# Patient Record
Sex: MALE | Race: BLACK OR AFRICAN AMERICAN | Employment: FULL TIME | URBAN - METROPOLITAN AREA
[De-identification: names, ages, dates, MRNs, and addresses within clinical notes are randomized per-mention and may not be internally consistent; named-entity substitution may affect disease eponyms.]

---

## 2017-02-09 ENCOUNTER — HOSPITAL ENCOUNTER (OUTPATIENT)
Dept: GENERAL RADIOLOGY | Age: 57
Discharge: HOME | End: 2017-02-09
Payer: COMMERCIAL

## 2017-02-09 ENCOUNTER — OFFICE VISIT (OUTPATIENT)
Dept: INTERNAL MEDICINE | Age: 57
End: 2017-02-09

## 2017-02-09 VITALS
DIASTOLIC BLOOD PRESSURE: 80 MMHG | OXYGEN SATURATION: 99 % | BODY MASS INDEX: 26.19 KG/M2 | HEIGHT: 75 IN | WEIGHT: 210.6 LBS | HEART RATE: 58 BPM | SYSTOLIC BLOOD PRESSURE: 142 MMHG | RESPIRATION RATE: 12 BRPM | TEMPERATURE: 97.1 F

## 2017-02-09 DIAGNOSIS — M17.0 OSTEOARTHRITIS OF BOTH KNEES, UNSPECIFIED OSTEOARTHRITIS TYPE: ICD-10-CM

## 2017-02-09 DIAGNOSIS — Z23 NEED FOR INFLUENZA VACCINATION: Primary | ICD-10-CM

## 2017-02-09 DIAGNOSIS — I10 ESSENTIAL HYPERTENSION: ICD-10-CM

## 2017-02-09 DIAGNOSIS — Z11.4 SCREENING FOR HIV (HUMAN IMMUNODEFICIENCY VIRUS): ICD-10-CM

## 2017-02-09 DIAGNOSIS — Z11.59 NEED FOR HEPATITIS C SCREENING TEST: ICD-10-CM

## 2017-02-09 DIAGNOSIS — Z98.890 S/P AORTIC ANEURYSM REPAIR: ICD-10-CM

## 2017-02-09 DIAGNOSIS — Z86.79 S/P AORTIC ANEURYSM REPAIR: ICD-10-CM

## 2017-02-09 PROCEDURE — 73565 X-RAY EXAM OF KNEES: CPT

## 2017-02-09 PROCEDURE — 90688 IIV4 VACCINE SPLT 0.5 ML IM: CPT | Performed by: FAMILY MEDICINE

## 2017-02-09 PROCEDURE — 90471 IMMUNIZATION ADMIN: CPT | Performed by: FAMILY MEDICINE

## 2017-02-09 PROCEDURE — 99204 OFFICE O/P NEW MOD 45 MIN: CPT | Performed by: FAMILY MEDICINE

## 2017-02-09 RX ORDER — AMLODIPINE BESYLATE 10 MG/1
10 TABLET ORAL DAILY
COMMUNITY

## 2017-02-09 RX ORDER — METOPROLOL TARTRATE 100 MG/1
100 TABLET ORAL 2 TIMES DAILY
COMMUNITY

## 2017-02-10 DIAGNOSIS — M17.0 OSTEOARTHRITIS OF BOTH KNEES, UNSPECIFIED OSTEOARTHRITIS TYPE: ICD-10-CM

## 2017-02-10 DIAGNOSIS — I10 ESSENTIAL HYPERTENSION: ICD-10-CM

## 2017-02-10 DIAGNOSIS — Z11.4 SCREENING FOR HIV (HUMAN IMMUNODEFICIENCY VIRUS): ICD-10-CM

## 2017-02-10 LAB
ALBUMIN SERPL-MCNC: 4.4 G/DL (ref 3.9–4.9)
ALP BLD-CCNC: 71 U/L (ref 35–104)
ALT SERPL-CCNC: 23 U/L (ref 0–41)
ANION GAP SERPL CALCULATED.3IONS-SCNC: 11 MEQ/L (ref 7–13)
AST SERPL-CCNC: 24 U/L (ref 0–40)
BASOPHILS ABSOLUTE: 0.1 K/UL (ref 0–0.2)
BASOPHILS RELATIVE PERCENT: 1.2 %
BILIRUB SERPL-MCNC: 0.5 MG/DL (ref 0–1.2)
BUN BLDV-MCNC: 17 MG/DL (ref 6–20)
CALCIUM SERPL-MCNC: 9.5 MG/DL (ref 8.6–10.2)
CHLORIDE BLD-SCNC: 102 MEQ/L (ref 98–107)
CHOLESTEROL, TOTAL: 209 MG/DL (ref 0–199)
CO2: 27 MEQ/L (ref 22–29)
CREAT SERPL-MCNC: 1.15 MG/DL (ref 0.7–1.2)
EOSINOPHILS ABSOLUTE: 0.2 K/UL (ref 0–0.7)
EOSINOPHILS RELATIVE PERCENT: 2.8 %
GFR AFRICAN AMERICAN: >60
GFR NON-AFRICAN AMERICAN: >60
GLOBULIN: 2.7 G/DL (ref 2.3–3.5)
GLUCOSE BLD-MCNC: 99 MG/DL (ref 74–109)
HCT VFR BLD CALC: 46.5 % (ref 42–52)
HDLC SERPL-MCNC: 84 MG/DL (ref 40–59)
HEMOGLOBIN: 15.6 G/DL (ref 14–18)
HEPATITIS C ANTIBODY INTERPRETATION: NORMAL
LDL CHOLESTEROL CALCULATED: 106 MG/DL (ref 0–129)
LYMPHOCYTES ABSOLUTE: 1.4 K/UL (ref 1–4.8)
LYMPHOCYTES RELATIVE PERCENT: 23.7 %
MCH RBC QN AUTO: 30.3 PG (ref 27–31.3)
MCHC RBC AUTO-ENTMCNC: 33.5 % (ref 33–37)
MCV RBC AUTO: 90.4 FL (ref 80–100)
MONOCYTES ABSOLUTE: 0.6 K/UL (ref 0.2–0.8)
MONOCYTES RELATIVE PERCENT: 9.7 %
NEUTROPHILS ABSOLUTE: 3.8 K/UL (ref 1.4–6.5)
NEUTROPHILS RELATIVE PERCENT: 62.6 %
PDW BLD-RTO: 13.1 % (ref 11.5–14.5)
PLATELET # BLD: 205 K/UL (ref 130–400)
POTASSIUM SERPL-SCNC: 4.2 MEQ/L (ref 3.5–5.1)
RBC # BLD: 5.15 M/UL (ref 4.7–6.1)
SODIUM BLD-SCNC: 140 MEQ/L (ref 132–144)
TOTAL PROTEIN: 7.1 G/DL (ref 6.4–8.1)
TRIGL SERPL-MCNC: 94 MG/DL (ref 0–200)
WBC # BLD: 6 K/UL (ref 4.8–10.8)

## 2017-02-12 LAB — HIV-1 AND HIV-2 ANTIBODIES: NEGATIVE

## 2017-02-13 ENCOUNTER — OFFICE VISIT (OUTPATIENT)
Dept: INTERNAL MEDICINE | Age: 57
End: 2017-02-13

## 2017-02-13 VITALS
HEART RATE: 52 BPM | TEMPERATURE: 97 F | HEIGHT: 74 IN | OXYGEN SATURATION: 99 % | RESPIRATION RATE: 12 BRPM | DIASTOLIC BLOOD PRESSURE: 78 MMHG | WEIGHT: 209 LBS | SYSTOLIC BLOOD PRESSURE: 152 MMHG | BODY MASS INDEX: 26.82 KG/M2

## 2017-02-13 DIAGNOSIS — I10 ESSENTIAL HYPERTENSION: Primary | ICD-10-CM

## 2017-02-13 DIAGNOSIS — M17.0 OSTEOARTHRITIS OF BOTH KNEES, UNSPECIFIED OSTEOARTHRITIS TYPE: ICD-10-CM

## 2017-02-13 PROCEDURE — 99214 OFFICE O/P EST MOD 30 MIN: CPT | Performed by: FAMILY MEDICINE

## 2017-02-13 RX ORDER — TRAMADOL HYDROCHLORIDE 50 MG/1
50 TABLET ORAL EVERY 8 HOURS PRN
Qty: 30 TABLET | Refills: 0 | Status: SHIPPED | OUTPATIENT
Start: 2017-02-13 | End: 2017-02-23

## 2017-02-17 ENCOUNTER — HOSPITAL ENCOUNTER (OUTPATIENT)
Dept: PHYSICAL THERAPY | Age: 57
Setting detail: THERAPIES SERIES
Discharge: HOME | End: 2017-02-17
Payer: COMMERCIAL

## 2017-02-17 PROCEDURE — 97110 THERAPEUTIC EXERCISES: CPT

## 2017-02-17 PROCEDURE — 97161 PT EVAL LOW COMPLEX 20 MIN: CPT

## 2017-02-21 ENCOUNTER — OFFICE VISIT (OUTPATIENT)
Dept: CARDIOLOGY | Age: 57
End: 2017-02-21

## 2017-02-21 ENCOUNTER — OFFICE VISIT (OUTPATIENT)
Dept: INTERNAL MEDICINE | Age: 57
End: 2017-02-21

## 2017-02-21 VITALS
OXYGEN SATURATION: 98 % | TEMPERATURE: 98.1 F | DIASTOLIC BLOOD PRESSURE: 84 MMHG | WEIGHT: 209.5 LBS | RESPIRATION RATE: 18 BRPM | HEIGHT: 74 IN | BODY MASS INDEX: 26.89 KG/M2 | SYSTOLIC BLOOD PRESSURE: 157 MMHG | HEART RATE: 57 BPM

## 2017-02-21 VITALS
WEIGHT: 209 LBS | SYSTOLIC BLOOD PRESSURE: 160 MMHG | TEMPERATURE: 96.8 F | OXYGEN SATURATION: 99 % | HEIGHT: 74 IN | BODY MASS INDEX: 26.82 KG/M2 | RESPIRATION RATE: 12 BRPM | HEART RATE: 58 BPM | DIASTOLIC BLOOD PRESSURE: 88 MMHG

## 2017-02-21 DIAGNOSIS — I10 ESSENTIAL HYPERTENSION: ICD-10-CM

## 2017-02-21 DIAGNOSIS — Z98.890 S/P THORACIC AORTIC ANEURYSM REPAIR: ICD-10-CM

## 2017-02-21 DIAGNOSIS — Z86.79 S/P THORACIC AORTIC ANEURYSM REPAIR: ICD-10-CM

## 2017-02-21 DIAGNOSIS — I10 ESSENTIAL HYPERTENSION: Primary | ICD-10-CM

## 2017-02-21 DIAGNOSIS — L65.9 HAIR LOSS: Primary | ICD-10-CM

## 2017-02-21 PROCEDURE — 93000 ELECTROCARDIOGRAM COMPLETE: CPT | Performed by: INTERNAL MEDICINE

## 2017-02-21 PROCEDURE — 99204 OFFICE O/P NEW MOD 45 MIN: CPT | Performed by: INTERNAL MEDICINE

## 2017-02-21 PROCEDURE — 99213 OFFICE O/P EST LOW 20 MIN: CPT | Performed by: FAMILY MEDICINE

## 2017-02-21 RX ORDER — HYDROCHLOROTHIAZIDE 25 MG/1
25 TABLET ORAL DAILY
Qty: 30 TABLET | Refills: 3 | Status: SHIPPED | OUTPATIENT
Start: 2017-02-21 | End: 2017-06-19 | Stop reason: SDUPTHER

## 2017-02-22 ENCOUNTER — HOSPITAL ENCOUNTER (OUTPATIENT)
Dept: PHYSICAL THERAPY | Age: 57
Setting detail: THERAPIES SERIES
Discharge: HOME | End: 2017-02-22
Payer: COMMERCIAL

## 2017-02-22 PROCEDURE — 97113 AQUATIC THERAPY/EXERCISES: CPT

## 2017-02-27 ENCOUNTER — HOSPITAL ENCOUNTER (OUTPATIENT)
Dept: PHYSICAL THERAPY | Age: 57
Setting detail: THERAPIES SERIES
Discharge: HOME | End: 2017-02-27
Payer: COMMERCIAL

## 2017-02-27 PROCEDURE — 97113 AQUATIC THERAPY/EXERCISES: CPT

## 2017-03-01 ENCOUNTER — HOSPITAL ENCOUNTER (OUTPATIENT)
Dept: PHYSICAL THERAPY | Age: 57
Setting detail: THERAPIES SERIES
Discharge: HOME | End: 2017-03-01
Payer: COMMERCIAL

## 2017-03-03 RX ORDER — IRBESARTAN 150 MG/1
150 TABLET ORAL NIGHTLY
Refills: 0 | COMMUNITY
Start: 2017-02-21

## 2017-03-06 ENCOUNTER — HOSPITAL ENCOUNTER (OUTPATIENT)
Dept: PHYSICAL THERAPY | Age: 57
Setting detail: THERAPIES SERIES
Discharge: HOME | End: 2017-03-06
Payer: COMMERCIAL

## 2017-03-09 ASSESSMENT — ENCOUNTER SYMPTOMS
EYES NEGATIVE: 1
SHORTNESS OF BREATH: 0
ALLERGIC/IMMUNOLOGIC NEGATIVE: 1
CHEST TIGHTNESS: 0

## 2017-03-10 DIAGNOSIS — M17.0 OSTEOARTHRITIS OF BOTH KNEES, UNSPECIFIED OSTEOARTHRITIS TYPE: ICD-10-CM

## 2017-03-10 RX ORDER — TRAMADOL HYDROCHLORIDE 50 MG/1
50 TABLET ORAL EVERY 8 HOURS PRN
Qty: 30 TABLET | Refills: 0 | OUTPATIENT
Start: 2017-03-10 | End: 2017-03-20

## 2017-03-13 ENCOUNTER — HOSPITAL ENCOUNTER (OUTPATIENT)
Dept: PHYSICAL THERAPY | Age: 57
Setting detail: THERAPIES SERIES
Discharge: HOME | End: 2017-03-13
Payer: COMMERCIAL

## 2017-03-22 ENCOUNTER — OFFICE VISIT (OUTPATIENT)
Dept: INTERNAL MEDICINE | Age: 57
End: 2017-03-22

## 2017-03-22 VITALS
BODY MASS INDEX: 27.03 KG/M2 | RESPIRATION RATE: 12 BRPM | DIASTOLIC BLOOD PRESSURE: 72 MMHG | HEIGHT: 74 IN | HEART RATE: 69 BPM | WEIGHT: 210.6 LBS | TEMPERATURE: 96.5 F | SYSTOLIC BLOOD PRESSURE: 148 MMHG | OXYGEN SATURATION: 98 %

## 2017-03-22 DIAGNOSIS — M17.0 OSTEOARTHRITIS OF BOTH KNEES, UNSPECIFIED OSTEOARTHRITIS TYPE: Primary | ICD-10-CM

## 2017-03-22 PROCEDURE — 20605 DRAIN/INJ JOINT/BURSA W/O US: CPT | Performed by: FAMILY MEDICINE

## 2017-03-22 RX ORDER — TRIAMCINOLONE ACETONIDE 40 MG/ML
40 INJECTION, SUSPENSION INTRA-ARTICULAR; INTRAMUSCULAR ONCE
Status: COMPLETED | OUTPATIENT
Start: 2017-03-22 | End: 2017-03-22

## 2017-03-22 RX ORDER — LIDOCAINE HYDROCHLORIDE 10 MG/ML
3 INJECTION, SOLUTION INFILTRATION; PERINEURAL ONCE
Status: COMPLETED | OUTPATIENT
Start: 2017-03-22 | End: 2017-03-22

## 2017-03-22 RX ADMIN — TRIAMCINOLONE ACETONIDE 40 MG: 40 INJECTION, SUSPENSION INTRA-ARTICULAR; INTRAMUSCULAR at 15:25

## 2017-03-22 RX ADMIN — LIDOCAINE HYDROCHLORIDE 3 ML: 10 INJECTION, SOLUTION INFILTRATION; PERINEURAL at 15:23

## 2017-03-29 ENCOUNTER — OFFICE VISIT (OUTPATIENT)
Dept: INTERNAL MEDICINE | Age: 57
End: 2017-03-29

## 2017-03-29 VITALS
WEIGHT: 210 LBS | RESPIRATION RATE: 12 BRPM | OXYGEN SATURATION: 99 % | DIASTOLIC BLOOD PRESSURE: 76 MMHG | TEMPERATURE: 96.1 F | BODY MASS INDEX: 26.96 KG/M2 | HEART RATE: 53 BPM | SYSTOLIC BLOOD PRESSURE: 138 MMHG

## 2017-03-29 DIAGNOSIS — M17.12 OSTEOARTHRITIS OF LEFT KNEE, UNSPECIFIED OSTEOARTHRITIS TYPE: Primary | ICD-10-CM

## 2017-03-29 PROCEDURE — 99213 OFFICE O/P EST LOW 20 MIN: CPT | Performed by: FAMILY MEDICINE

## 2017-03-29 PROCEDURE — 20610 DRAIN/INJ JOINT/BURSA W/O US: CPT | Performed by: FAMILY MEDICINE

## 2017-03-29 RX ORDER — LIDOCAINE HYDROCHLORIDE 10 MG/ML
3 INJECTION, SOLUTION INFILTRATION; PERINEURAL ONCE
Status: COMPLETED | OUTPATIENT
Start: 2017-03-29 | End: 2017-03-29

## 2017-03-29 RX ORDER — TRIAMCINOLONE ACETONIDE 40 MG/ML
40 INJECTION, SUSPENSION INTRA-ARTICULAR; INTRAMUSCULAR ONCE
Status: COMPLETED | OUTPATIENT
Start: 2017-03-29 | End: 2017-03-29

## 2017-03-29 RX ADMIN — LIDOCAINE HYDROCHLORIDE 3 ML: 10 INJECTION, SOLUTION INFILTRATION; PERINEURAL at 09:29

## 2017-03-29 RX ADMIN — TRIAMCINOLONE ACETONIDE 40 MG: 40 INJECTION, SUSPENSION INTRA-ARTICULAR; INTRAMUSCULAR at 09:31

## 2017-11-12 ENCOUNTER — APPOINTMENT (OUTPATIENT)
Dept: GENERAL RADIOLOGY | Age: 57
End: 2017-11-12
Payer: COMMERCIAL

## 2017-11-12 ENCOUNTER — HOSPITAL ENCOUNTER (EMERGENCY)
Age: 57
Discharge: HOME | End: 2017-11-12
Attending: STUDENT IN AN ORGANIZED HEALTH CARE EDUCATION/TRAINING PROGRAM
Payer: COMMERCIAL

## 2017-11-12 VITALS
BODY MASS INDEX: 25.03 KG/M2 | OXYGEN SATURATION: 99 % | DIASTOLIC BLOOD PRESSURE: 87 MMHG | RESPIRATION RATE: 14 BRPM | HEART RATE: 61 BPM | TEMPERATURE: 97.9 F | WEIGHT: 195 LBS | HEIGHT: 74 IN | SYSTOLIC BLOOD PRESSURE: 154 MMHG

## 2017-11-12 DIAGNOSIS — S60.051A CONTUSION OF RIGHT LITTLE FINGER WITHOUT DAMAGE TO NAIL, INITIAL ENCOUNTER: Primary | ICD-10-CM

## 2017-11-12 PROCEDURE — 29130 APPL FINGER SPLINT STATIC: CPT

## 2017-11-12 PROCEDURE — 99283 EMERGENCY DEPT VISIT LOW MDM: CPT

## 2017-11-12 PROCEDURE — 73130 X-RAY EXAM OF HAND: CPT

## 2017-11-12 RX ORDER — M-VIT,TX,IRON,MINS/CALC/FOLIC 27MG-0.4MG
1 TABLET ORAL DAILY
COMMUNITY

## 2017-11-12 ASSESSMENT — PAIN SCALES - GENERAL: PAINLEVEL_OUTOF10: 4

## 2017-11-12 ASSESSMENT — ENCOUNTER SYMPTOMS
ABDOMINAL PAIN: 0
VOMITING: 0
SHORTNESS OF BREATH: 0
NAUSEA: 0
DIARRHEA: 0
COUGH: 0

## 2017-11-12 ASSESSMENT — PAIN DESCRIPTION - PROGRESSION: CLINICAL_PROGRESSION: GRADUALLY WORSENING

## 2017-11-12 ASSESSMENT — PAIN DESCRIPTION - DESCRIPTORS: DESCRIPTORS: CONSTANT;THROBBING

## 2017-11-12 ASSESSMENT — PAIN DESCRIPTION - PAIN TYPE: TYPE: ACUTE PAIN

## 2017-11-12 ASSESSMENT — PAIN DESCRIPTION - LOCATION: LOCATION: FINGER (COMMENT WHICH ONE)

## 2017-11-12 ASSESSMENT — PAIN DESCRIPTION - ORIENTATION: ORIENTATION: RIGHT

## 2017-11-12 ASSESSMENT — PAIN DESCRIPTION - ONSET: ONSET: ON-GOING

## 2017-11-12 ASSESSMENT — PAIN DESCRIPTION - FREQUENCY: FREQUENCY: CONTINUOUS

## 2017-11-12 NOTE — ED NOTES
Right hand 5th finger splint applied-pt angely well-msp's intact     Kaitlin Warren, RN  11/12/17 7214

## 2017-11-12 NOTE — ED PROVIDER NOTES
Musculoskeletal:        Right hand: He exhibits tenderness and swelling. Hands: There is edema to the distal fifth digit on the right hand. No ecchymosis. No erythema. No areas of necrosis skin sloughing vesicles bullae crepitus or subcutaneous emphysema. No signs of an infection or secondary infection. Neurological: He is alert and oriented to person, place, and time. He has normal strength. Skin: Skin is warm, dry and intact. No rash noted. He is not diaphoretic. Nursing note and vitals reviewed. DIAGNOSTIC RESULTS     RADIOLOGY:   Non-plain film images such as CT, Ultrasound and MRI are read by the radiologist. Plain radiographic images are visualized and preliminarily interpreted by Oz Page CNP with the below findings:    No obvious fracture or dislocation. Interpretation per the Radiologist below, if available at the time of this note:    XR HAND RIGHT (MIN 3 VIEWS)    (Results Pending)       LABS:  Labs Reviewed - No data to display    All other labs were within normal range or not returned as of this dictation. EMERGENCY DEPARTMENT COURSE and DIFFERENTIAL DIAGNOSIS/MDM:   Vitals:    Vitals:    11/12/17 0725   BP: (!) 154/87   Pulse: 61   Resp: 14   Temp: 97.9 °F (36.6 °C)   TempSrc: Oral   SpO2: 99%   Weight: 195 lb (88.5 kg)   Height: 6' 2\" (1.88 m)       ED Course        MDM appears well nontoxic and in no distress. There is some swelling to the fifth digit, it is distal.  There are no signs of infection or secondary infection. X-ray will be obtained to evaluate for trauma or pathology. X-ray did not reveal an obvious fracture or dislocation. See as the patient has had this swelling for 3 weeks and will have him follow-up with orthopedics for continued evaluation and management. He will be discharged home in stable condition and continues to deny the need for analgesic medications. Standard anticipatory guidance given to patient upon discharge.  Have given them a specific time frame in which to follow-up and who to follow-up with. I have also advised them that they should return to the emergency department if they get worse, or not getting better or develop any new or concerning symptoms. Patient demonstrates understanding. PROCEDURES:    Procedures      FINAL IMPRESSION      1.  Contusion of right little finger without damage to nail, initial encounter          DISPOSITION/PLAN   DISPOSITION     PATIENT REFERRED TO:  Chi Walker MD  [address] [phone]            DISCHARGE MEDICATIONS:  New Prescriptions    No medications on file       (Please note that portions of this note were completed with a voice recognition program.  Efforts were made to edit the dictations but occasionally words are mis-transcribed.)    Tyrone Ulloa, [address] [facility], [place]  11/12/17 4910

## 2018-08-19 ENCOUNTER — HOSPITAL ENCOUNTER (EMERGENCY)
Age: 58
Discharge: HOME | End: 2018-08-19
Attending: FAMILY MEDICINE
Payer: COMMERCIAL

## 2018-08-19 ENCOUNTER — APPOINTMENT (OUTPATIENT)
Dept: CT IMAGING | Age: 58
End: 2018-08-19
Payer: COMMERCIAL

## 2018-08-19 VITALS
WEIGHT: 200 LBS | OXYGEN SATURATION: 99 % | HEIGHT: 74 IN | BODY MASS INDEX: 25.67 KG/M2 | DIASTOLIC BLOOD PRESSURE: 97 MMHG | RESPIRATION RATE: 16 BRPM | TEMPERATURE: 97.8 F | HEART RATE: 71 BPM | SYSTOLIC BLOOD PRESSURE: 180 MMHG

## 2018-08-19 DIAGNOSIS — S16.1XXA STRAIN OF NECK MUSCLE, INITIAL ENCOUNTER: Primary | ICD-10-CM

## 2018-08-19 PROCEDURE — 6360000002 HC RX W HCPCS: Performed by: FAMILY MEDICINE

## 2018-08-19 PROCEDURE — 99283 EMERGENCY DEPT VISIT LOW MDM: CPT

## 2018-08-19 PROCEDURE — 70450 CT HEAD/BRAIN W/O DYE: CPT

## 2018-08-19 PROCEDURE — 96372 THER/PROPH/DIAG INJ SC/IM: CPT

## 2018-08-19 PROCEDURE — 72125 CT NECK SPINE W/O DYE: CPT

## 2018-08-19 PROCEDURE — 6370000000 HC RX 637 (ALT 250 FOR IP): Performed by: FAMILY MEDICINE

## 2018-08-19 RX ORDER — HYDROCODONE BITARTRATE AND ACETAMINOPHEN 5; 325 MG/1; MG/1
1 TABLET ORAL ONCE
Status: COMPLETED | OUTPATIENT
Start: 2018-08-19 | End: 2018-08-19

## 2018-08-19 RX ORDER — CYCLOBENZAPRINE HCL 10 MG
10 TABLET ORAL 3 TIMES DAILY PRN
Qty: 30 TABLET | Refills: 1 | Status: SHIPPED | OUTPATIENT
Start: 2018-08-19 | End: 2018-08-29

## 2018-08-19 RX ORDER — NAPROXEN 500 MG/1
500 TABLET ORAL 2 TIMES DAILY
Qty: 60 TABLET | Refills: 0 | Status: SHIPPED | OUTPATIENT
Start: 2018-08-19

## 2018-08-19 RX ORDER — CYCLOBENZAPRINE HCL 10 MG
10 TABLET ORAL ONCE
Status: DISCONTINUED | OUTPATIENT
Start: 2018-08-19 | End: 2018-08-19

## 2018-08-19 RX ORDER — ORPHENADRINE CITRATE 30 MG/ML
60 INJECTION INTRAMUSCULAR; INTRAVENOUS ONCE
Status: COMPLETED | OUTPATIENT
Start: 2018-08-19 | End: 2018-08-19

## 2018-08-19 RX ORDER — KETOROLAC TROMETHAMINE 30 MG/ML
60 INJECTION, SOLUTION INTRAMUSCULAR; INTRAVENOUS ONCE
Status: COMPLETED | OUTPATIENT
Start: 2018-08-19 | End: 2018-08-19

## 2018-08-19 RX ADMIN — ORPHENADRINE CITRATE 60 MG: 30 INJECTION INTRAMUSCULAR; INTRAVENOUS at 14:32

## 2018-08-19 RX ADMIN — KETOROLAC TROMETHAMINE 60 MG: 30 INJECTION, SOLUTION INTRAMUSCULAR; INTRAVENOUS at 14:33

## 2018-08-19 RX ADMIN — HYDROCODONE BITARTRATE AND ACETAMINOPHEN 1 TABLET: 5; 325 TABLET ORAL at 15:13

## 2018-08-19 ASSESSMENT — PAIN DESCRIPTION - LOCATION: LOCATION: BACK

## 2018-08-19 ASSESSMENT — ENCOUNTER SYMPTOMS
BACK PAIN: 0
RESPIRATORY NEGATIVE: 1

## 2018-08-19 ASSESSMENT — PAIN SCALES - GENERAL
PAINLEVEL_OUTOF10: 7
PAINLEVEL_OUTOF10: 8

## 2018-08-19 ASSESSMENT — PAIN DESCRIPTION - ORIENTATION: ORIENTATION: RIGHT

## 2019-06-12 ENCOUNTER — HOSPITAL ENCOUNTER (OUTPATIENT)
Dept: ORTHOPEDIC SURGERY | Age: 59
Discharge: HOME | End: 2019-06-14
Payer: COMMERCIAL

## 2019-06-12 DIAGNOSIS — M25.519 ARTHRALGIA OF SHOULDER, UNSPECIFIED LATERALITY: ICD-10-CM

## 2019-06-12 PROCEDURE — 73030 X-RAY EXAM OF SHOULDER: CPT

## 2021-05-18 ENCOUNTER — HOSPITAL ENCOUNTER (EMERGENCY)
Age: 61
Discharge: HOME | End: 2021-05-18
Payer: COMMERCIAL

## 2021-05-18 ENCOUNTER — APPOINTMENT (OUTPATIENT)
Dept: GENERAL RADIOLOGY | Age: 61
End: 2021-05-18
Payer: COMMERCIAL

## 2021-05-18 VITALS
OXYGEN SATURATION: 99 % | SYSTOLIC BLOOD PRESSURE: 147 MMHG | TEMPERATURE: 97.4 F | HEART RATE: 62 BPM | BODY MASS INDEX: 26.95 KG/M2 | WEIGHT: 210 LBS | DIASTOLIC BLOOD PRESSURE: 67 MMHG | HEIGHT: 74 IN | RESPIRATION RATE: 18 BRPM

## 2021-05-18 DIAGNOSIS — S86.911A STRAIN OF RIGHT KNEE, INITIAL ENCOUNTER: Primary | ICD-10-CM

## 2021-05-18 PROCEDURE — 73562 X-RAY EXAM OF KNEE 3: CPT

## 2021-05-18 PROCEDURE — 6370000000 HC RX 637 (ALT 250 FOR IP): Performed by: PHYSICIAN ASSISTANT

## 2021-05-18 PROCEDURE — 99285 EMERGENCY DEPT VISIT HI MDM: CPT

## 2021-05-18 RX ORDER — IBUPROFEN 800 MG/1
800 TABLET ORAL EVERY 8 HOURS PRN
Qty: 20 TABLET | Refills: 0 | Status: SHIPPED | OUTPATIENT
Start: 2021-05-18

## 2021-05-18 RX ORDER — HYDROCODONE BITARTRATE AND ACETAMINOPHEN 5; 325 MG/1; MG/1
1 TABLET ORAL EVERY 6 HOURS PRN
Qty: 10 TABLET | Refills: 0 | Status: SHIPPED | OUTPATIENT
Start: 2021-05-18 | End: 2021-05-21

## 2021-05-18 RX ORDER — IBUPROFEN 800 MG/1
800 TABLET ORAL ONCE
Status: COMPLETED | OUTPATIENT
Start: 2021-05-18 | End: 2021-05-18

## 2021-05-18 RX ADMIN — IBUPROFEN 800 MG: 800 TABLET, FILM COATED ORAL at 11:55

## 2021-05-18 ASSESSMENT — PAIN SCALES - GENERAL: PAINLEVEL_OUTOF10: 3

## 2021-05-18 ASSESSMENT — PAIN DESCRIPTION - ORIENTATION: ORIENTATION: RIGHT

## 2021-05-18 ASSESSMENT — PAIN DESCRIPTION - ONSET: ONSET: ON-GOING

## 2021-05-18 ASSESSMENT — ENCOUNTER SYMPTOMS: BACK PAIN: 0

## 2021-05-18 NOTE — ED PROVIDER NOTES
[facility] ED  eMERGENCYdEPARTMENT eNCOUnter      Pt Name: Navjot Brar  MRN: [id number]  Armstrongfjimmy 1960of evaluation: 5/18/2021  Moe Mathews PA-C    CHIEF COMPLAINT       Chief Complaint   Patient presents with    Knee Pain     fall going down stairs, landed on right knee         HISTORY OF PRESENT ILLNESS  (Location/Symptom, Timing/Onset, Context/Setting, Quality, Duration, Modifying Factors, Severity.)   Navjot Brar is a 62 y.o. male who presents to the emergency department Right knee pain. Patient was descending steps and his foot slipped, striking his heel against the ground. He states when he did so he felt pain in the anterior portion of his knee. Patient denies head injury or loss of consciousness. No numbness to extremity. Orthopedic preference: [address]   [facility]. 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 The history is provided by the patient. Nursing Notes were reviewed and I agree. REVIEW OF SYSTEMS    (2-9 systems for level 4, 10 or more for level 5)     Review of Systems   Musculoskeletal: Positive for arthralgias and gait problem. Negative for back pain and neck pain. Skin: Negative for rash. Neurological: Negative for weakness and numbness. as noted above the remainder of the review of systems was reviewed and negative.        PAST MEDICAL HISTORY     Past Medical History:   Diagnosis Date    Hypertension     Osteoarthritis     knees         SURGICAL HISTORY       Past Surgical History:   Procedure Laterality Date    AORTIC VALVE REPLACEMENT      HAND SURGERY Left     reconstruction from saw accident         CURRENT MEDICATIONS       Discharge Medication List as of 5/18/2021 11:47 AM      CONTINUE these medications which have NOT CHANGED    Details   naproxen (NAPROSYN) 500 MG tablet Take 1 tablet by mouth 2 times daily, Disp-60 tablet, R-0Print      Multiple Vitamins-Minerals  Fear of Current or Ex-Partner:     Emotionally Abused:     Physically Abused:     Sexually Abused:        SCREENINGS    [place] Coma Scale  Eye Opening: Spontaneous  Best Verbal Response: Oriented  Best Motor Response: Obeys commands  [place] Coma Scale Score: 15      PHYSICAL EXAM    (up to 7 forlevel 4, 8 or more for level 5)     ED Triage Vitals [05/18/21 1035]   BP Temp Temp Source Pulse Resp SpO2 Height Weight   (!) 147/67 97.4 °F (36.3 °C) Oral 62 18 99 % 6' 2\" (1.88 m) 210 lb (95.3 kg)       Physical Exam  Vitals and nursing note reviewed. Constitutional:       General: He is not in acute distress. Appearance: He is well-developed. He is not diaphoretic. HENT:      Head: Normocephalic and atraumatic. Right Ear: External ear normal.      Left Ear: External ear normal.      Nose: Nose normal.   Eyes:      Conjunctiva/sclera: Conjunctivae normal.      Pupils: Pupils are equal, round, and reactive to light. Pulmonary:      Effort: Pulmonary effort is normal.   Musculoskeletal:      Cervical back: Normal range of motion and neck supple. Right knee: Swelling and effusion present. No deformity or erythema. Tenderness present over the medial joint line and lateral joint line. No LCL laxity, MCL laxity, ACL laxity or PCL laxity. Left knee: Normal.        Legs:       Comments: No foot drop. Distal sensation and circulation fully intact. No distal leg laxity with straight leg raise. Skin:     General: Skin is warm and dry. Neurological:      Mental Status: He is alert and oriented to person, place, and time.    Psychiatric:         Behavior: Behavior normal.           DIAGNOSTIC RESULTS     RADIOLOGY:   Non-plain film images such as CT, Ultrasound and MRI are read by the radiologist. Plain radiographic images are visualized and preliminarilyinterpreted by Nanette Jennings PA-C with the below findings:    XR: RT KNEE -no fracture or dislocation    Patient notified that his X-rays will additionally be reviewed by a radiologist and he will be notified of any discrepancies. Interpretation per the Radiologist below, if available at the time of this note:    XR KNEE RIGHT (3 VIEWS)   Final Result   1. Indistinct patellar tendon and heterogeneous appearance of the infrapatellar fat, findings concerning for focal contusion and possible patellar tendon tear. 2. Patella ed             LABS:  Labs Reviewed - No data to display    All other labs were within normal range or not returnedas of this dictation. EMERGENCYDEPARTMENT COURSE and DIFFERENTIAL DIAGNOSIS/MDM:   Vitals:    Vitals:    05/18/21 1035   BP: (!) 147/67   Pulse: 62   Resp: 18   Temp: 97.4 °F (36.3 °C)   TempSrc: Oral   SpO2: 99%   Weight: 210 lb (95.3 kg)   Height: 6' 2\" (1.88 m)           MDM  Ortho follow-up  Ace wrap applied  Crutches supplied  Apply a compressive ACE bandage. Rest and elevate the affected painful area. Apply cold compresses intermittently as needed. As pain recedes, begin normal activities slowly as tolerated. Call if symptoms persist.      PROCEDURES:    Procedures      FINAL IMPRESSION      1. Strain of right knee, initial encounter          DISPOSITION/PLAN   DISPOSITION Decision To Discharge 05/18/2021 11:39:18 AM      PATIENT REFERRED TO:  Carolina Corona II, MD  [facility] [address] [id number]  [phone]    In 2 days        DISCHARGE MEDICATIONS:  Discharge Medication List as of 5/18/2021 11:47 AM      START taking these medications    Details   ibuprofen (ADVIL;MOTRIN) 800 MG tablet Take 1 tablet by mouth every 8 hours as needed for Pain or Fever, Disp-20 tablet, R-0Normal      HYDROcodone-acetaminophen (NORCO) 5-325 MG per tablet Take 1 tablet by mouth every 6 hours as needed for Pain for up to 3 days. , Disp-10 tablet, R-0Print             (Please note thatportions of this note were completed with a voice recognition program.  Efforts were made to edit the dictations but occasionally words are mis-transcribed.)    JAYCE Vázquez PA-C  05/18/21 9096

## 2021-05-18 NOTE — ED TRIAGE NOTES
Pt to ed from home via triage with c/o right knee pain following a fall from standing. Pt reports that he slipped going down stairs, shoes were wet, 3 stairs. Landed on right knee. No other injury or trauma. Pt denies head injury or LOC. Moderate swelling noted. Skin WDI.  Respirations even and unlabored

## 2021-05-21 ENCOUNTER — HOSPITAL ENCOUNTER (OUTPATIENT)
Dept: MRI IMAGING | Age: 61
Discharge: HOME | End: 2021-05-23
Payer: COMMERCIAL

## 2021-05-21 DIAGNOSIS — M25.461 EFFUSION OF RIGHT KNEE: ICD-10-CM

## 2021-05-21 DIAGNOSIS — M23.91 DERANGEMENT OF RIGHT KNEE: ICD-10-CM

## 2021-05-21 PROCEDURE — 73721 MRI JNT OF LWR EXTRE W/O DYE: CPT

## 2021-11-16 LAB
ANION GAP SERPL CALCULATED.3IONS-SCNC: 11 MEQ/L (ref 9–15)
BUN BLDV-MCNC: 17 MG/DL (ref 8–23)
CALCIUM SERPL-MCNC: 9.8 MG/DL (ref 8.5–9.9)
CHLORIDE BLD-SCNC: 101 MEQ/L (ref 95–107)
CHOLESTEROL, TOTAL: 175 MG/DL (ref 0–199)
CO2: 27 MEQ/L (ref 20–31)
CREAT SERPL-MCNC: 1.09 MG/DL (ref 0.7–1.2)
GFR AFRICAN AMERICAN: >60
GFR NON-AFRICAN AMERICAN: >60
GLUCOSE BLD-MCNC: 94 MG/DL (ref 70–99)
HDLC SERPL-MCNC: 62 MG/DL (ref 40–59)
LDL CHOLESTEROL CALCULATED: 101 MG/DL (ref 0–129)
POTASSIUM SERPL-SCNC: 4.4 MEQ/L (ref 3.4–4.9)
SODIUM BLD-SCNC: 139 MEQ/L (ref 135–144)
TOTAL CK: 83 U/L (ref 0–190)
TRIGL SERPL-MCNC: 62 MG/DL (ref 0–150)

## 2023-05-10 NOTE — ED PROVIDER NOTES
Continue Regimen: Clobetasol 0.05% cream bid Abdominal: Soft. Bowel sounds are normal.   Musculoskeletal: Normal range of motion. He exhibits no edema. Cervical back: He exhibits pain. He exhibits normal range of motion, no bony tenderness, no swelling, no edema, no deformity and no laceration. Back:    Lymphadenopathy:     He has no cervical adenopathy. Neurological: He is alert and oriented to person, place, and time. He has normal reflexes. Psychiatric: He has a normal mood and affect. Judgment normal.   Nursing note and vitals reviewed. DIAGNOSTIC RESULTS     EKG: All EKG's are interpreted by the Emergency Department Physician who either signs or Co-signs this chart in the absence of a cardiologist.         RADIOLOGY:   Non-plain film images such as CT, Ultrasound and MRI are read by the radiologist. Plain radiographic images are visualized and preliminarily interpreted by the emergency physician with the below findings:      Interpretation per the Radiologist below, if available at the time of this note:    CT CERVICAL SPINE WO CONTRAST   Final Result      NO ACUTE FRACTURES. All CT scans at this facility use dose modulation, iterative reconstruction, and/or weight based dosing when appropriate to reduce radiation dose to as low as reasonably achievable. CT HEAD WO CONTRAST   Final Result      NO ACUTE FRACTURES. All CT scans at this facility use dose modulation, iterative reconstruction, and/or weight based dosing when appropriate to reduce radiation dose to as low as reasonably achievable. ED BEDSIDE ULTRASOUND:   Performed by ED Physician - none    LABS:  Labs Reviewed - No data to display    All other labs were within normal range or not returned as of this dictation.     EMERGENCY DEPARTMENT COURSE and DIFFERENTIAL DIAGNOSIS/MDM:   Vitals:    Vitals:    08/19/18 1346   BP: (!) 180/97   Pulse: 71   Resp: 16   Temp: 97.8 °F (36.6 °C)   TempSrc: Oral   SpO2: 99%   Weight: 200 lb (90.7 Render In Strict Bullet Format?: No Detail Level: Zone kg)   Height: 6' 2\" (1.88 m)                  MDM  Number of Diagnoses or Management Options  Strain of neck muscle, initial encounter:   Diagnosis management comments: Patient presents with head and neck pain CT of the head and neck showed no acute sees no fracture or dislocation patient was given Toradol and Norflex Norco significant improvement sent home with a prescription for Naprosyn and Flexeril       Amount and/or Complexity of Data Reviewed  Clinical lab tests: ordered and reviewed  Tests in the radiology section of CPT®: ordered and reviewed      CONSULTS:  None    PROCEDURES:  Unless otherwise noted below, none     Procedures    FINAL IMPRESSION      1. Strain of neck muscle, initial encounter          DISPOSITION/PLAN   DISPOSITION Decision To Discharge 08/19/2018 03:35:59 PM      PATIENT REFERRED TO:  No follow-up provider specified.     DISCHARGE MEDICATIONS:  Discharge Medication List as of 8/19/2018  3:37 PM      START taking these medications    Details   naproxen (NAPROSYN) 500 MG tablet Take 1 tablet by mouth 2 times daily, Disp-60 tablet, R-0Print                (Please note that portions of this note were completed with a voice recognition program.  Efforts were made to edit the dictations but occasionally words are mis-transcribed.)    Shantel Lao MD (electronically signed)  Attending Emergency Physician          Adelaida Hebert MD  08/19/18 0011 Plan: Diagnosis discussed with patient in detail. Dapsone and hydroxychloroquine in reserve. Patient made aware a baseline visual field study test as well as an annual test would be needed as well. Recheck in 1 month as scheduled.

## 2023-12-13 ENCOUNTER — OFFICE VISIT (OUTPATIENT)
Dept: FAMILY MEDICINE CLINIC | Age: 63
End: 2023-12-13
Payer: COMMERCIAL

## 2023-12-13 VITALS
SYSTOLIC BLOOD PRESSURE: 112 MMHG | BODY MASS INDEX: 27.8 KG/M2 | TEMPERATURE: 97.3 F | WEIGHT: 216.6 LBS | HEART RATE: 63 BPM | DIASTOLIC BLOOD PRESSURE: 64 MMHG | HEIGHT: 74 IN | OXYGEN SATURATION: 97 %

## 2023-12-13 DIAGNOSIS — H91.8X3 OTHER SPECIFIED HEARING LOSS OF BOTH EARS: Primary | ICD-10-CM

## 2023-12-13 DIAGNOSIS — H61.23 BILATERAL IMPACTED CERUMEN: ICD-10-CM

## 2023-12-13 PROCEDURE — 3078F DIAST BP <80 MM HG: CPT | Performed by: FAMILY MEDICINE

## 2023-12-13 PROCEDURE — 3074F SYST BP LT 130 MM HG: CPT | Performed by: FAMILY MEDICINE

## 2023-12-13 PROCEDURE — 99202 OFFICE O/P NEW SF 15 MIN: CPT | Performed by: FAMILY MEDICINE

## 2023-12-13 RX ORDER — LIDOCAINE 50 MG/G
PATCH TOPICAL
COMMUNITY
Start: 2022-04-18

## 2023-12-13 RX ORDER — ACETAMINOPHEN 325 MG/1
TABLET ORAL
COMMUNITY

## 2023-12-13 RX ORDER — CLOPIDOGREL BISULFATE 75 MG/1
75 TABLET ORAL DAILY
COMMUNITY
Start: 2023-11-13 | End: 2024-02-11

## 2023-12-13 RX ORDER — METOPROLOL TARTRATE 100 MG/1
25 TABLET ORAL 2 TIMES DAILY
COMMUNITY

## 2023-12-13 RX ORDER — SILDENAFIL 50 MG/1
25 TABLET, FILM COATED ORAL
COMMUNITY
Start: 2021-10-06

## 2023-12-13 SDOH — ECONOMIC STABILITY: FOOD INSECURITY: WITHIN THE PAST 12 MONTHS, THE FOOD YOU BOUGHT JUST DIDN'T LAST AND YOU DIDN'T HAVE MONEY TO GET MORE.: NEVER TRUE

## 2023-12-13 SDOH — ECONOMIC STABILITY: FOOD INSECURITY: WITHIN THE PAST 12 MONTHS, YOU WORRIED THAT YOUR FOOD WOULD RUN OUT BEFORE YOU GOT MONEY TO BUY MORE.: NEVER TRUE

## 2023-12-13 SDOH — ECONOMIC STABILITY: HOUSING INSECURITY
IN THE LAST 12 MONTHS, WAS THERE A TIME WHEN YOU DID NOT HAVE A STEADY PLACE TO SLEEP OR SLEPT IN A SHELTER (INCLUDING NOW)?: NO

## 2023-12-13 SDOH — ECONOMIC STABILITY: INCOME INSECURITY: HOW HARD IS IT FOR YOU TO PAY FOR THE VERY BASICS LIKE FOOD, HOUSING, MEDICAL CARE, AND HEATING?: NOT HARD AT ALL

## 2023-12-13 ASSESSMENT — PATIENT HEALTH QUESTIONNAIRE - PHQ9
SUM OF ALL RESPONSES TO PHQ QUESTIONS 1-9: 0
2. FEELING DOWN, DEPRESSED OR HOPELESS: 0
SUM OF ALL RESPONSES TO PHQ QUESTIONS 1-9: 0
1. LITTLE INTEREST OR PLEASURE IN DOING THINGS: 0
SUM OF ALL RESPONSES TO PHQ9 QUESTIONS 1 & 2: 0
SUM OF ALL RESPONSES TO PHQ QUESTIONS 1-9: 0
SUM OF ALL RESPONSES TO PHQ QUESTIONS 1-9: 0

## 2023-12-13 NOTE — PROGRESS NOTES
Nani Tilley 1960 is a 59 y.o. male who presents today with:  Chief Complaint   Patient presents with    Health Maintenance     Reports he follows with the [place] for all medical concerns. Cerumen Impaction     C/O bilateral cerumen impaction. He has diminished hearing and feels pressure in both of his ears. He has been using OTC wax remover drops in his ears since yesterday. HPI  I have reviewed HPI and agree with above. Review of Systems   All other systems reviewed and are negative.       Past Medical History:   Diagnosis Date    Hypertension     Osteoarthritis     knees     Past Surgical History:   Procedure Laterality Date    AORTIC VALVE REPLACEMENT      HAND SURGERY Left     reconstruction from saw accident     Social History     Socioeconomic History    Marital status:      Spouse name: Not on file    Number of children: Not on file    Years of education: Not on file    Highest education level: Not on file   Occupational History    Not on file   Tobacco Use    Smoking status: Former     Packs/day: 1.00     Years: 25.00     Additional pack years: 0.00     Total pack years: 25.00     Types: Cigarettes     Start date: 27     Quit date: 2008     Years since quitting: 15.9    Smokeless tobacco: Never   Vaping Use    Vaping Use: Never used   Substance and Sexual Activity    Alcohol use: No    Drug use: No    Sexual activity: Yes   Other Topics Concern    Not on file   Social History Narrative    Not on file     Social Determinants of Health     Financial Resource Strain: Low Risk  (12/13/2023)    Overall Financial Resource Strain (CARDIA)     Difficulty of Paying Living Expenses: Not hard at all   Food Insecurity: No Food Insecurity (12/13/2023)    Hunger Vital Sign     Worried About Running Out of Food in the Last Year: Never true     [address] in the Last Year: Never true   Transportation Needs: Unknown (12/13/2023)    PRAPARE - Transportation     Lack of

## 2023-12-18 PROBLEM — M17.11 RIGHT KNEE DJD: Status: ACTIVE | Noted: 2023-12-18

## 2023-12-18 PROBLEM — I35.9 AORTIC VALVE DISORDER: Status: ACTIVE | Noted: 2023-12-18

## 2023-12-18 PROBLEM — I71.20 ANEURYSM OF THORACIC AORTA (CMS-HCC): Status: ACTIVE | Noted: 2023-12-18

## 2023-12-18 PROBLEM — M25.469 KNEE EFFUSION: Status: ACTIVE | Noted: 2023-12-18

## 2023-12-18 PROBLEM — S86.811S PATELLAR TENDON RUPTURE, RIGHT, SEQUELA: Status: ACTIVE | Noted: 2023-12-18

## 2023-12-18 PROBLEM — I48.92 ATRIAL FLUTTER (MULTI): Status: ACTIVE | Noted: 2023-12-18

## 2023-12-18 PROBLEM — M25.561 KNEE PAIN, RIGHT: Status: ACTIVE | Noted: 2023-12-18

## 2023-12-18 PROBLEM — M17.9 KNEE OSTEOARTHRITIS: Status: ACTIVE | Noted: 2023-12-18

## 2023-12-18 PROBLEM — I34.0 MITRAL REGURGITATION: Status: ACTIVE | Noted: 2023-12-18

## 2023-12-18 PROBLEM — I10 HYPERTENSION: Status: ACTIVE | Noted: 2023-12-18

## 2023-12-18 PROBLEM — M25.562 KNEE PAIN, LEFT: Status: ACTIVE | Noted: 2023-12-18

## 2023-12-18 PROBLEM — I07.1 TRICUSPID REGURGITATION: Status: ACTIVE | Noted: 2023-12-18

## 2023-12-18 PROBLEM — M23.90 KNEE INTERNAL DERANGEMENT: Status: ACTIVE | Noted: 2023-12-18

## 2023-12-18 PROBLEM — R09.89 BRUIT: Status: ACTIVE | Noted: 2023-12-18

## 2023-12-18 RX ORDER — MULTIVITAMIN
1 TABLET ORAL DAILY
COMMUNITY

## 2023-12-18 RX ORDER — AMLODIPINE BESYLATE 5 MG/1
1 TABLET ORAL DAILY
COMMUNITY
End: 2024-01-09 | Stop reason: SDUPTHER

## 2023-12-18 RX ORDER — METOPROLOL SUCCINATE 100 MG/1
100 TABLET, EXTENDED RELEASE ORAL 2 TIMES DAILY
COMMUNITY
End: 2024-01-09 | Stop reason: SDUPTHER

## 2023-12-18 RX ORDER — ACETAMINOPHEN 325 MG/1
325 TABLET ORAL EVERY 4 HOURS PRN
COMMUNITY

## 2023-12-18 RX ORDER — ASPIRIN 81 MG/1
1 TABLET ORAL DAILY
COMMUNITY
End: 2024-01-09

## 2024-01-09 ENCOUNTER — OFFICE VISIT (OUTPATIENT)
Dept: CARDIOLOGY | Facility: CLINIC | Age: 64
End: 2024-01-09
Payer: COMMERCIAL

## 2024-01-09 VITALS
HEART RATE: 62 BPM | SYSTOLIC BLOOD PRESSURE: 118 MMHG | DIASTOLIC BLOOD PRESSURE: 78 MMHG | BODY MASS INDEX: 28.25 KG/M2 | WEIGHT: 220 LBS

## 2024-01-09 DIAGNOSIS — I71.20 THORACIC AORTIC ANEURYSM WITHOUT RUPTURE, UNSPECIFIED PART (CMS-HCC): ICD-10-CM

## 2024-01-09 DIAGNOSIS — I10 PRIMARY HYPERTENSION: Primary | ICD-10-CM

## 2024-01-09 DIAGNOSIS — E78.00 PURE HYPERCHOLESTEROLEMIA: ICD-10-CM

## 2024-01-09 PROBLEM — I25.10 CAD (CORONARY ARTERY DISEASE): Status: ACTIVE | Noted: 2024-01-09

## 2024-01-09 PROBLEM — I67.1: Status: ACTIVE | Noted: 2024-01-09

## 2024-01-09 PROBLEM — I34.0 MITRAL REGURGITATION: Status: RESOLVED | Noted: 2023-12-18 | Resolved: 2024-01-09

## 2024-01-09 PROBLEM — I48.92 ATRIAL FLUTTER (MULTI): Status: RESOLVED | Noted: 2023-12-18 | Resolved: 2024-01-09

## 2024-01-09 PROBLEM — R09.89 BRUIT: Status: RESOLVED | Noted: 2023-12-18 | Resolved: 2024-01-09

## 2024-01-09 PROCEDURE — 3078F DIAST BP <80 MM HG: CPT | Performed by: INTERNAL MEDICINE

## 2024-01-09 PROCEDURE — 99214 OFFICE O/P EST MOD 30 MIN: CPT | Performed by: INTERNAL MEDICINE

## 2024-01-09 PROCEDURE — 3074F SYST BP LT 130 MM HG: CPT | Performed by: INTERNAL MEDICINE

## 2024-01-09 RX ORDER — METOPROLOL SUCCINATE 100 MG/1
100 TABLET, EXTENDED RELEASE ORAL 2 TIMES DAILY
Qty: 200 TABLET | Refills: 3 | Status: SHIPPED | OUTPATIENT
Start: 2024-01-09

## 2024-01-09 RX ORDER — CLOPIDOGREL BISULFATE 75 MG/1
75 TABLET ORAL DAILY
COMMUNITY
End: 2024-01-09 | Stop reason: SDUPTHER

## 2024-01-09 RX ORDER — ASPIRIN 325 MG
325 TABLET ORAL DAILY
COMMUNITY

## 2024-01-09 RX ORDER — CLOPIDOGREL BISULFATE 75 MG/1
75 TABLET ORAL DAILY
Qty: 100 TABLET | Refills: 3 | Status: SHIPPED | OUTPATIENT
Start: 2024-01-09

## 2024-01-09 RX ORDER — AMLODIPINE BESYLATE 5 MG/1
5 TABLET ORAL DAILY
Qty: 100 TABLET | Refills: 3 | Status: SHIPPED | OUTPATIENT
Start: 2024-01-09

## 2024-01-09 NOTE — PROGRESS NOTES
Patient:  Jon Mendes  YOB: 1960  MRN: [id number]       Impression/Plan:     Primary hypertension  -     Well-controlled on amlodipine and beta-blocker.  Continue beta-blocker to assure sure stress is minimal given his aneurysmal disease    Thoracic aortic aneurysm without rupture, unspecified part (CMS/[facility])  -     clopidogrel (Plavix) 75 mg tablet; Take 1 tablet (75 mg) by mouth once daily.  -     Referral to Cardiovascular Genetics; Future  -     He did have genetic testing with the panel being cardiomyopathy and arrhythmia in 2021.  It showed findings of unknown significance.  I would like him to go back to [facility] genetics for testing in relation to his intracranial and thoracic aneurysm.  Furthermore repeat testing after 3 years can sometimes identify findings that are now better understood that could contribute to his issues.  Furthermore he does have children.  I have been encouraged to have follow-up to be sure they do not have thoracic disease but if an identifiable gene could be determined that would be of benefit further follow-up as well  -      CT last month shows very stable surgical result and he follows at [facility]    Pure hypercholesterolemia        -       He does have trivial coronary disease based on plaque seen at angiography.  Given his clear-cut vascular disease would like to avoid any intra-arterial plaque formation and will repeat lipids as last was 3 years ago and if elevated would add statin to his medical therapy    Coronary Art Dis         -      Trivial coronary disease at catheterization 4 years ago.  Cholesterol is mildly elevated but HDL is reasonably high as well.  Will repeat lipids if LDL remains over 100 would strongly consider institution of statin therapy.  He has a very high functional capacity without any symptoms whatsoever and is on dual antiplatelet therapy at this time        Chief Complaint/Active Symptoms:       Jon Mendes  is a 63 y.o. male who presents with hypertension, ascending aortic aneurysm with 2 prior surgeries at [facility] initially 2002 for dissection then repeat procedure 2019 including replacement of ascending aorta elephant trunk graft valve sparing aortic root replacement resuspension.  At that time coronary angiography with trivial irregularities only.  2023 underwent percutaneous treatment of intracranial aneurysm in November and August had craniotomy for clipping of anterior choroidal artery aneurysm.  He has mild hyperlipidemia.    2019 at time of his valve sparing aortic root replacement for 8.2 cm aneurysm which time he received a 32 mm Dacron Valsalva graft and a 26 mm Dacron Gelweave frozen elephant trunk with 100 x 28 mm tag he had episodes of A-fib but self converted and was in sinus rhythm at time of discharge.  He had use of amiodarone transiently at that time but none since    Admitted to [facility] neurosurgery 11/13/2023 through 11/14/2023 with Dr. Eddie Montanez.   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  He underwent flow diversion of the aneurysm during that hospital stay utilizing endovascular approach and intra-arterial embolization of right SHEA with flow diversion.   It would appear he was to be on aspirin and Plavix but that is also not clear in the discharge summary     he has had prior cerebral aneurysm repairs uncomplicated in the past.      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    Echo 12/19/2023: EF 56%.  Mild RV dilatation with slight decreased RV systolic function.  Generalized aorta borderline dilated at 39 mm.  No AI.  No aortic stenosis.  No MR described    2019: Coronary angiography trivial irregularities noted except LM which is normal.    He has history of thoracic aortic aneurysm followed at  "[facility].  History of prior aortic root dissection initially in 2002 and then redo of ascending/arch/descending aorta in 2019 [facility] with elephant trunk procedure.Valve sparing aortic root replacement resuspended in 32 m Dacron Valsalva Graft. Replacement of ascending aorta + aortic arch with 26 mm Dacron Gelweave. Frozen elephant trunk [place] 100x28- 2019\"     Chest CTA 12/19/2023 excellent postop appearance and excellent surgical result.  Mildly ectatic proximal descending aorta at 42 mm unchanged from previous    Was seen by Dr. Gee 5/9/2023 without cardiovascular pathology.  [facility].  He follows for his aneurysms both thoracic and cerebrovascular at [facility].  He has not had history of coronary artery disease.  He did have atrial fibrillation 2019 after his elephant trunk procedure but none documented since.    Cholesterol November 2021 175 HDL 62     January 2021 did have genetic molecular testing for arrhythmia and cardiomyopathy panel which had findings of unknown significance only.    He denies angina, dyspnea, palpitation, edema, lightheadedness or syncope.  He has had no symptoms of claudication or neurologic deterioration.  There have been no hospitalizations or emergency room visits since last office visit.    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  He is very active at home as well caring heavy things up and down stairs as he works on his house.  He works outside and has no cardiorespiratory symptoms with activity.      Review of Systems: Unremarkable except as noted above    Meds     Current Outpatient Medications   Medication Instructions    acetaminophen (TYLENOL) 325 mg, oral, Every 4 hours PRN    amLODIPine (NORVASC) 5 mg, oral, Daily    aspirin 325 mg, oral, Daily    clopidogrel (PLAVIX) 75 mg, oral, Daily    metoprolol succinate XL (TOPROL-XL) 100 mg, oral, 2 times daily    multivitamin " "tablet 1 tablet, oral, Daily        Allergies     Allergies   Allergen Reactions    Iodinated Contrast Media Unknown         Annotated Problems     Specialty Problems          Cardiology Problems    Aneurysm of thoracic aorta (CMS/HCC)     Chest CTA 12/19/2023 excellent postop appearance and excellent surgical result.  Mildly ectatic proximal descending aorta at 42 mm unchanged from previous    History of prior aortic root dissection initially in 2002 and then redo of ascending/arch/descending aorta in 2019 [facility] with elephant trunk procedure.Valve sparing aortic root replacement resuspended in 32 m Dacron Valsalva Graft. Replacement of ascending aorta + aortic arch with 26 mm Dacron Gelweave. Frozen elephant trunk [place] 100x28- 2019\"          Aortic valve disorder     Echo 12/19/2023: EF 56%.  Mild RV dilatation with slight decreased RV systolic function.  Generalized aorta borderline dilated at 39 mm.  No AI.  No aortic stenosis.  No MR described         Hypertension    Tricuspid regurgitation    CAD (coronary artery disease)     2019: Coronary angiography trivial irregularities noted except LM which is normal.         Pure hypercholesterolemia        Problem List     Patient Active Problem List    Diagnosis Date Noted    Cerebrovascular aneurysm 01/09/2024    CAD (coronary artery disease) 01/09/2024    Pure hypercholesterolemia 01/09/2024    Aneurysm of thoracic aorta (CMS/HCC) 12/18/2023    Aortic valve disorder 12/18/2023    Hypertension 12/18/2023    Knee effusion 12/18/2023    Knee internal derangement 12/18/2023    Knee osteoarthritis 12/18/2023    Right knee DJD 12/18/2023    Knee pain, left 12/18/2023    Knee pain, right 12/18/2023    Patellar tendon rupture, right, sequela 12/18/2023    Tricuspid regurgitation 12/18/2023       Objective:     Vitals:    01/09/24 0838 01/09/24 0841   BP: 122/88 118/78   BP Location: Right arm Left arm   Patient Position: Sitting Sitting   Pulse: 62    Weight: 99.8 " kg (220 lb)       Wt Readings from Last 4 Encounters:   01/09/24 99.8 kg (220 lb)   05/09/23 94.8 kg (209 lb)   10/18/22 94.3 kg (208 lb)   11/16/21 96.7 kg (213 lb 2 oz)           LAB:     Lab Results   Component Value Date    WBC 6.8 05/26/2021    HGB 14.6 05/26/2021    HCT 43.2 05/26/2021     05/26/2021    CHOL 185 03/16/2021    TRIG 250 (H) 03/16/2021    HDL 50.0 03/16/2021    ALT 29 05/26/2021    AST 29 05/26/2021     05/26/2021    K 3.9 05/26/2021     05/26/2021    CREATININE 1.09 05/26/2021    BUN 18 05/26/2021    CO2 27 05/26/2021    INR 1.0 05/26/2021       Diagnostic Studies:     No results found.      Radiology:     No orders to display       Physical Exam     General Appearance: alert and oriented to person, place and time, in no acute distress  Cardiovascular: normal rate, regular rhythm, normal S1 and S2, no murmurs, rubs, clicks, or gallops,  no JVD  Pulmonary/Chest: clear to auscultation bilaterally- no wheezes, rales or rhonchi, normal air movement, no respiratory distress  Abdomen: soft, non-tender, non-distended, normal bowel sounds, no masses   Extremities: no cyanosis, clubbing or edema  Skin: warm and dry, no rash or erythema  Eyes: EOMI  Neck: supple and non-tender without mass, no thyromegaly   Neurological: alert, oriented, normal speech, no focal findings or movement disorder noted  Vascular: Pulses 2+ no bruits appreciated      Scribe Attestation  By signing my name below, I, Pollo Esqueda MD , Scribe   attest that this documentation has been prepared under the direction and in the presence of Pollo Esqueda MD.

## 2024-01-09 NOTE — PATIENT INSTRUCTIONS
Please bring all medicines, vitamins, and herbal supplements with you in original bottles to every appointment!!!!    Prescriptions will not be filled unless you are compliant with your follow up appointments or have a follow up appointment scheduled as per instruction of your physician. Refills should be requested at the time of your visit.

## 2024-04-19 ENCOUNTER — TELEPHONE (OUTPATIENT)
Dept: CARDIOLOGY | Facility: CLINIC | Age: 64
End: 2024-04-19
Payer: COMMERCIAL

## 2024-04-19 NOTE — TELEPHONE ENCOUNTER
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     Form rec'd and placed on Dr. Esqueda's desk.     Last office note 1/9/24 and echo 1/3/23 attached and to be faxed with form.    Requesting most recent stress test within last 2 years. Routed to Dr. Esqueda to see if stress test needed as I do not see one in chart.    Per pt may have been done 2019 at [facility].

## 2024-04-29 ENCOUNTER — HOSPITAL ENCOUNTER (EMERGENCY)
Age: 64
Discharge: HOME | End: 2024-04-29
Attending: STUDENT IN AN ORGANIZED HEALTH CARE EDUCATION/TRAINING PROGRAM
Payer: COMMERCIAL

## 2024-04-29 ENCOUNTER — APPOINTMENT (OUTPATIENT)
Dept: CT IMAGING | Age: 64
End: 2024-04-29
Payer: COMMERCIAL

## 2024-04-29 VITALS
SYSTOLIC BLOOD PRESSURE: 156 MMHG | DIASTOLIC BLOOD PRESSURE: 86 MMHG | WEIGHT: 205 LBS | RESPIRATION RATE: 18 BRPM | BODY MASS INDEX: 26.32 KG/M2 | TEMPERATURE: 97.7 F | HEART RATE: 65 BPM | OXYGEN SATURATION: 100 %
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
## 2024-04-29 DIAGNOSIS — S13.4XXA WHIPLASH INJURY TO NECK, INITIAL ENCOUNTER: ICD-10-CM

## 2024-04-29 PROCEDURE — 99284 EMERGENCY DEPT VISIT MOD MDM: CPT

## 2024-04-29 PROCEDURE — 72125 CT NECK SPINE W/O DYE: CPT

## 2024-04-29 PROCEDURE — 6360000002 HC RX W HCPCS: Performed by: STUDENT IN AN ORGANIZED HEALTH CARE EDUCATION/TRAINING PROGRAM

## 2024-04-29 PROCEDURE — 96372 THER/PROPH/DIAG INJ SC/IM: CPT

## 2024-04-29 RX ORDER — TIZANIDINE 4 MG/1
4 TABLET ORAL EVERY 8 HOURS PRN
Qty: 15 TABLET | Refills: 0 | Status: SHIPPED | OUTPATIENT
Start: 2024-04-29 | End: 2024-05-04

## 2024-04-29 RX ORDER — ORPHENADRINE CITRATE 30 MG/ML
60 INJECTION INTRAMUSCULAR; INTRAVENOUS ONCE
Status: COMPLETED | OUTPATIENT
Start: 2024-04-29 | End: 2024-04-29

## 2024-04-29 RX ADMIN — ORPHENADRINE CITRATE 60 MG: 60 INJECTION INTRAMUSCULAR; INTRAVENOUS at 19:15

## 2024-04-29 ASSESSMENT — PAIN SCALES - GENERAL: PAINLEVEL_OUTOF10: 4

## 2024-04-29 ASSESSMENT — PAIN DESCRIPTION - DESCRIPTORS: DESCRIPTORS: ACHING;SORE

## 2024-04-29 ASSESSMENT — PAIN DESCRIPTION - LOCATION
LOCATION: NECK
LOCATION: NECK

## 2024-04-29 ASSESSMENT — PAIN - FUNCTIONAL ASSESSMENT: PAIN_FUNCTIONAL_ASSESSMENT: 0-10
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
## 2024-06-05 ENCOUNTER — APPOINTMENT (OUTPATIENT)
Dept: SLEEP MEDICINE | Facility: CLINIC | Age: 64
End: 2024-06-05
Payer: COMMERCIAL

## 2024-06-12 ENCOUNTER — OFFICE VISIT (OUTPATIENT)
Dept: ORTHOPEDIC SURGERY | Facility: CLINIC | Age: 64
End: 2024-06-12
Payer: COMMERCIAL

## 2024-06-12 DIAGNOSIS — M17.0 BILATERAL PRIMARY OSTEOARTHRITIS OF KNEE: Primary | ICD-10-CM

## 2024-06-12 PROCEDURE — 20610 DRAIN/INJ JOINT/BURSA W/O US: CPT | Performed by: ORTHOPAEDIC SURGERY

## 2024-06-12 PROCEDURE — 99214 OFFICE O/P EST MOD 30 MIN: CPT | Performed by: ORTHOPAEDIC SURGERY

## 2024-06-12 RX ORDER — LIDOCAINE HYDROCHLORIDE 10 MG/ML
5 INJECTION INFILTRATION; PERINEURAL
Status: COMPLETED | OUTPATIENT
Start: 2024-06-12 | End: 2024-06-12

## 2024-06-12 RX ORDER — BETAMETHASONE SODIUM PHOSPHATE AND BETAMETHASONE ACETATE 3; 3 MG/ML; MG/ML
12 INJECTION, SUSPENSION INTRA-ARTICULAR; INTRALESIONAL; INTRAMUSCULAR; SOFT TISSUE
Status: COMPLETED | OUTPATIENT
Start: 2024-06-12 | End: 2024-06-12

## 2024-06-12 RX ADMIN — BETAMETHASONE SODIUM PHOSPHATE AND BETAMETHASONE ACETATE 12 MG: 3; 3 INJECTION, SUSPENSION INTRA-ARTICULAR; INTRALESIONAL; INTRAMUSCULAR; SOFT TISSUE at 15:16

## 2024-06-12 RX ADMIN — LIDOCAINE HYDROCHLORIDE 5 ML: 10 INJECTION INFILTRATION; PERINEURAL at 15:16

## 2024-06-12 NOTE — PROGRESS NOTES
History: Jon is here for his knees.  He has moderate arthritis in both knees and does well with cortisone.  His last injections were 9 months ago.  He takes occasional medication as needed.    Past medical history: Multiple  Medications: Multiple  Allergies: No known drug allergies    Please refer to the intake H&P regarding the patient's review of systems, family history and social history as was done today    HEENT: Normal  Lungs: Clear to auscultation  Heart: RRR  Abdomen: Soft, nontender  Skin: clear  Extremity: He has tenderness more medially.  1+ crepitus with range of motion.  Negative Lachman and posterior drawer.  Mild laxity with varus stress.  Similar findings on both sides.  Upper extremity exam is normal for strength, motion, stability and neurovascular assessment.    Radiographs: Previous x-rays show moderate degenerative change slightly worse on the right than the left.    Assessment: Moderate bilateral knee DJD, right greater than left    Plan: We again discussed multiple options for treatment.  He seems to respond quite well to cortisone and like to do this again today.  He can use over-the-counter medicine as needed.  Will see him back if his pain should recur.    L Inj/Asp: bilateral knee on 6/12/2024 3:16 PM  Indications: pain  Details: 22 G needle, lateral approach  Medications (Right): 5 mL lidocaine 10 mg/mL (1 %); 12 mg betamethasone acet,sod phos 6 mg/mL  Medications (Left): 5 mL lidocaine 10 mg/mL (1 %); 12 mg betamethasone acet,sod phos 6 mg/mL  Outcome: tolerated well, no immediate complications  Procedure, treatment alternatives, risks and benefits explained, specific risks discussed. Consent was given by the patient. Immediately prior to procedure a time out was called to verify the correct patient, procedure, equipment, support staff and site/side marked as required. Patient was prepped and draped in the usual sterile fashion.          All questions were answered today with  the patient.    This note was generated with voice recognition software and may contain grammatical errors.

## 2024-07-24 ENCOUNTER — APPOINTMENT (OUTPATIENT)
Dept: SLEEP MEDICINE | Facility: CLINIC | Age: 64
End: 2024-07-24
Payer: COMMERCIAL

## 2024-07-24 VITALS
HEART RATE: 68 BPM | HEIGHT: 74 IN | BODY MASS INDEX: 25.93 KG/M2 | OXYGEN SATURATION: 98 % | WEIGHT: 202 LBS | SYSTOLIC BLOOD PRESSURE: 128 MMHG | DIASTOLIC BLOOD PRESSURE: 76 MMHG

## 2024-07-24 DIAGNOSIS — G47.33 OBSTRUCTIVE SLEEP APNEA (ADULT) (PEDIATRIC): Primary | ICD-10-CM

## 2024-07-24 DIAGNOSIS — R06.3 CHEYNE-STOKES BREATHING: ICD-10-CM

## 2024-07-24 PROCEDURE — 3074F SYST BP LT 130 MM HG: CPT | Performed by: INTERNAL MEDICINE

## 2024-07-24 PROCEDURE — G2211 COMPLEX E/M VISIT ADD ON: HCPCS | Performed by: INTERNAL MEDICINE

## 2024-07-24 PROCEDURE — 1036F TOBACCO NON-USER: CPT | Performed by: INTERNAL MEDICINE

## 2024-07-24 PROCEDURE — 3078F DIAST BP <80 MM HG: CPT | Performed by: INTERNAL MEDICINE

## 2024-07-24 PROCEDURE — 99204 OFFICE O/P NEW MOD 45 MIN: CPT | Performed by: INTERNAL MEDICINE

## 2024-07-24 PROCEDURE — 3008F BODY MASS INDEX DOCD: CPT | Performed by: INTERNAL MEDICINE

## 2024-07-24 ASSESSMENT — SLEEP AND FATIGUE QUESTIONNAIRES
HOW LIKELY ARE YOU TO NOD OFF OR FALL ASLEEP IN A CAR, WHILE STOPPED FOR A FEW MINUTES IN TRAFFIC: WOULD NEVER DOZE
WAKING_TOO_EARLY: MILD
HOW LIKELY ARE YOU TO NOD OFF OR FALL ASLEEP WHILE SITTING QUIETLY AFTER LUNCH WITHOUT ALCOHOL: SLIGHT CHANCE OF DOZING
SLEEP_PROBLEM_NOTICEABLE_TO_OTHERS: NOT AT ALL NOTICEABLE
HOW LIKELY ARE YOU TO NOD OFF OR FALL ASLEEP WHILE SITTING AND TALKING TO SOMEONE: WOULD NEVER DOZE
HOW LIKELY ARE YOU TO NOD OFF OR FALL ASLEEP WHILE LYING DOWN TO REST IN THE AFTERNOON WHEN CIRCUMSTANCES PERMIT: SLIGHT CHANCE OF DOZING
DIFFICULTY_FALLING_ASLEEP: MILD
ESS-CHAD TOTAL SCORE: 2
HOW LIKELY ARE YOU TO NOD OFF OR FALL ASLEEP WHEN YOU ARE A PASSENGER IN A CAR FOR AN HOUR WITHOUT A BREAK: WOULD NEVER DOZE
HOW LIKELY ARE YOU TO NOD OFF OR FALL ASLEEP WHILE WATCHING TV: WOULD NEVER DOZE
WORRIED_DISTRESSED_DUE_TO_SLEEP: NOT AT ALL NOTICEABLE
HOW LIKELY ARE YOU TO NOD OFF OR FALL ASLEEP WHILE SITTING AND READING: WOULD NEVER DOZE
SLEEP_PROBLEM_INTERFERES_DAILY_ACTIVITIES: NOT AT ALL NOTICEABLE
SITING INACTIVE IN A PUBLIC PLACE LIKE A CLASS ROOM OR A MOVIE THEATER: WOULD NEVER DOZE
SATISFACTION_WITH_CURRENT_SLEEP_PATTERN: VERY SATISFIED

## 2024-07-24 ASSESSMENT — PAIN SCALES - GENERAL: PAINLEVEL: 0-NO PAIN

## 2024-07-24 NOTE — PROGRESS NOTES
Patient: Jon Mendes    [id number]  : 1960 -- AGE 64 y.o.    Provider: Adan Whittington MD     Location [facility]   Service Date: 2024              [facility]  New Visit Note      Subjective     HISTORY OF PRESENT ILLNESS     The patient's referring provider is: No ref. provider found    HISTORY OF PRESENT ILLNESS   Jon Mendes is a 64 y.o. male who presents to a [facility] for a sleep medicine evaluation with concerns of Sleep Study ( For RTA ).     The patient  has a past medical history of Personal history of other diseases of the circulatory system (2021), Personal history of other diseases of the circulatory system (2021), Personal history of other diseases of the musculoskeletal system and connective tissue (2021), and Personal history of other diseases of the nervous system and sense organs (2021)..    PAST SLEEP HISTORY  2024: HSAT watch PAT: Interpreted by Kermit Phan at the [facility].  pAHI 3% 37.9, pAHI 4% 21.4.  Reports SpO2 with banding pattern seems to be present central events with Cheyne-Cote respiration possibly..  Ordered an echo.  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    Sleep schedule  on weekdays / work days:  Usual Bedtime 9 PM  Falls asleep around 9:30 PM  Wake time 6 AM    Sleep schedule  on weekends/non work days :  Usual Bedtime 11 PM  Falls asleep around 11:30 PM  Wake time 7 AM  Naps  No    Total sleep time average is 7-9 hours/day    Preferred sleeping position: side lying      REVIEW OF SYSTEMS     REVIEW OF SYSTEMS  Review of Systems    Sleep-related ROS:  nocturnal awakenings and nocturia    RLS Screen:  He does not have unusual sensations in legs    Sleep-related behaviors:   DENIES  dreams upon falling asleep  hypnagogic/hypnopompic  hallucinations  sleep paralysis  sleep attacks  sleep walking  kicking, punching, or acting out dreams      Daytime Symptoms    Patient denies daytime symptoms including: Denies: excessive daytime sleepiness sleep inertia late to work/school due to sleepiness irritability during the day difficulty with memory or concentration during the day feeling sleepy when driving    ESS 2   Insomnia Severity Index  1. Difficulty falling asleep: Mild  2. Difficulty staying asleep: None  3. Problems waking up too early: Mild  4. How SATISFIED/DISSATISFIED are you with your CURRENT sleep pattern?: Very Satisfied  5. How NOTICEABLE to others do you think your sleep problem is in terms of impairing the quality of your life?: Not at all Noticeable  6. How WORRIED/DISTRESSED are you about your current sleep problem?: Not at all Noticeable  7. To what extent do you consider your sleep problem to INTERFERE with your daily functioning (e.g. daytime fatigue, mood, ability to function at work/daily chores, concentration, memory, mood, etc.) CURRENTLY?: Not at all Noticeable  MURALI TS: 0-7 = No clinically significant insomnia 8-14 = Subthreshold insomnia 15-21 = Clinical insomnia (moderate severity) 22-28 = Clinical insomnia (severe): 2     FOSQ 40      ALLERGIES AND MEDICATIONS     ALLERGIES  Allergies   Allergen Reactions    Iodinated Contrast Media Unknown       MEDICATIONS  Current Outpatient Medications   Medication Sig Dispense Refill    acetaminophen (Tylenol) 325 mg tablet Take 1 tablet (325 mg) by mouth every 4 hours if needed.      amLODIPine (Norvasc) 5 mg tablet Take 1 tablet (5 mg) by mouth once daily. 100 tablet 3    aspirin 325 mg tablet Take 1 tablet (325 mg) by mouth once daily.      clopidogrel (Plavix) 75 mg tablet Take 1 tablet (75 mg) by mouth once daily. 100 tablet 3    metoprolol succinate XL (Toprol-XL) 100 mg 24 hr tablet Take 1 tablet (100 mg) by mouth 2 times a day. 200 tablet 3    multivitamin tablet Take 1 tablet  "by mouth once daily.       No current facility-administered medications for this visit.       PAST HISTORY     PAST MEDICAL HISTORY  He  has a past medical history of Personal history of other diseases of the circulatory system (05/26/2021), Personal history of other diseases of the circulatory system (05/26/2021), Personal history of other diseases of the musculoskeletal system and connective tissue (05/26/2021), and Personal history of other diseases of the nervous system and sense organs (05/26/2021).    PAST SURGICAL HISTORY:  Past Surgical History:   Procedure Laterality Date    OTHER SURGICAL HISTORY  05/26/2021    Heart surgery    OTHER SURGICAL HISTORY  11/10/2021    Abdominal aortic dissection repair    OTHER SURGICAL HISTORY  11/10/2021    Complete colonoscopy    OTHER SURGICAL HISTORY  11/10/2021    Wrist surgery    OTHER SURGICAL HISTORY  10/18/2022    Corneal lasik       FAMILY HISTORY  No family history on file.        SOCIAL HISTORY  He  reports that he has never smoked. He has never been exposed to tobacco smoke. He has never used smokeless tobacco. He reports that he does not currently use alcohol. He reports that he does not use drugs.       PHYSICAL EXAM   Objective   VITAL SIGNS: /76   Pulse 68   Ht 1.88 m (6' 2\")   Wt 91.6 kg (202 lb)   SpO2 98%   BMI 25.94 kg/m²      CURRENT WEIGHT:   Vitals:    07/24/24 0950   Weight: 91.6 kg (202 lb)      BMI: Body mass index is 25.94 kg/m².   PREVIOUS WEIGHTS:  Wt Readings from Last 3 Encounters:   07/24/24 91.6 kg (202 lb)   01/09/24 99.8 kg (220 lb)   05/09/23 94.8 kg (209 lb)       Physical Exam  PHYSICAL EXAM: GENERAL: alert pleasant and cooperative no acute distress  nasal mucosa , normal, and pink  MODIFIED BLANTON SCORE: I  MODIFIED MALLAMPATI SCORE: II (hard and soft palate, upper portion of tonsils anduvula visible)  UVULA: midline  TONSILLAR SCORE: 1+  TONGUE SCALLOPING: midline  PSYCH EXAM: alert,oriented, in NAD with a full range of " "affect, normal behavior and no psychotic features  Neck circumference 16 \"    RESULTS/DATA     Bicarbonate (mmol/L)   Date Value   05/26/2021 27   03/16/2021 24   11/26/2019 28     1/3/2023: Echocardiogram: Left ventricle normal in size.  Mild septal left ventricular hypertrophy.  Left ventricular systolic function normal at EF equals 57±5% (2D plain).  Right ventricle dilated.  Right ventricular systolic function mildly decreased.  Aorta is dilated with maximum dimension 4.1 cm.  Tricuspid aortic valve.  Status post aortic valve resuspension.  Trace aortic valve regurgitation.  Peak gradient 6 mm 3, mean gradient 3 mmHg and dimension value index is 0.64.  No significant change from previous echo from 2/17/2022      ASSESSMENT/PLAN     Mr. Mendes is a 64 y.o. male and  has a past medical history of Personal history of other diseases of the circulatory system (05/26/2021), Personal history of other diseases of the circulatory system (05/26/2021), Personal history of other diseases of the musculoskeletal system and connective tissue (05/26/2021), and Personal history of other diseases of the nervous system and sense organs (05/26/2021). He was referred to the [facility] for evaluation of sleep apnea    Problem List and Orders  Problem List Items Addressed This Visit             ICD-10-CM    Cheyne-Cote breathing R06.3     Appearance on oximetry from Watch PAT. Recommended in lab titration         Obstructive sleep apnea (adult) (pediatric) - Primary G47.33     Symptoms and physical exam are suggestive of sleep disordered breathing.    Jon needs further evaluation through sleep testing.    We will follow-up with management options once testing is completed  Jon verbalized understanding  Recommended follow-up 3-4 weeks after testing to discuss results and treatment options     Patient has been diagnosed with obstructive sleep apnea patient will be started on PAP therapy " based on titration study results.  Patient will be brought back to evaluate for compliance within 31-90 days to initiation of therapy.          For a person with a diagnosis of sleep apnea to qualify for work in the transportation industry (and/or maintain commercial drivers or 's license), the following conditions must be met:    Sleep apnea must be eliminated by the treatment device in the laboratory, which is usually accomplished with CPAP.    Sleep apnea must be treated at home by demonstrating adhererence night to night CPAP use without evidence for significant sleep apnea (as demonstrated from objective adherence report and complete control of sleep apnea symptoms).    The candidate may need to demonstrate normal levels of daytime alertness on a maintenance of wakefulness testing following an overnight sleep study on effective treatment (varies by employers).           Relevant Orders    In-Center Sleep Study (Sleep Provider Only)

## 2024-07-24 NOTE — LETTER
July 24, 2024     Patient: Jon Mendes   YOB: 1960   Date of Visit: 7/24/2024       To Whom It May Concern:    It is my medical opinion that Jon Mendes  He is currently undergoing treatment for his sleep apnea He requires an in lab titration and then will be prescribed therapy.    If you have any questions or concerns, please don't hesitate to call.         Sincerely,        Adan Whittington MD    CC: No Recipients

## 2024-07-24 NOTE — PATIENT INSTRUCTIONS
For a person with a diagnosis of sleep apnea to qualify for work in the transportation industry (and/or maintain commercial drivers or 's license), the following conditions must be met:    Sleep apnea must be eliminated by the treatment device in the laboratory, which is usually accomplished with CPAP.    Sleep apnea must be treated at home by demonstrating adhererence night to night CPAP use without evidence for significant sleep apnea (as demonstrated from objective adherence report and complete control of sleep apnea symptoms).    The candidate may need to demonstrate normal levels of daytime alertness on a maintenance of wakefulness testing following an overnight sleep study on effective treatment (varies by employers).      Call  the  Sleep Center ([phone]) to speak with a sleep testing center  to book your overnight sleep study procedure at one of our adult and pediatric-friendly sleep labs. Overnight sleep studies may be scheduled on a weekday or weekend.      We have child-life services on a case-by-case basis at the [facility] location. We also perform daytime testing for shift workers on a case by case basis.     For the study: Bring usual medications and nightly routine items for your sleep study.  You may wish to bring a snack and nightly routine items you feel would be important to help you sleep (e.g. favorite pillow). Bring your sleep logs/requested paperwork to your sleep study appointment.     Results of your sleep study will be given to the ordering clinician. Please contact their office for results or followup as directed by your clinician. For additional information about the sleep medicine services, please call [phone].     Locations for sleep studies are [facility]), [facility], [place], [facility], [place], [place], [facility], and [place].

## 2024-07-24 NOTE — ASSESSMENT & PLAN NOTE
Symptoms and physical exam are suggestive of sleep disordered breathing.    Jon needs further evaluation through sleep testing.    We will follow-up with management options once testing is completed  Jon verbalized understanding  Recommended follow-up 3-4 weeks after testing to discuss results and treatment options     Patient has been diagnosed with obstructive sleep apnea patient will be started on PAP therapy based on titration study results.  Patient will be brought back to evaluate for compliance within 31-90 days to initiation of therapy.          For a person with a diagnosis of sleep apnea to qualify for work in the transportation industry (and/or maintain commercial drivers or 's license), the following conditions must be met:    Sleep apnea must be eliminated by the treatment device in the laboratory, which is usually accomplished with CPAP.    Sleep apnea must be treated at home by demonstrating adhererence night to night CPAP use without evidence for significant sleep apnea (as demonstrated from objective adherence report and complete control of sleep apnea symptoms).    The candidate may need to demonstrate normal levels of daytime alertness on a maintenance of wakefulness testing following an overnight sleep study on effective treatment (varies by employers).

## 2024-08-18 ENCOUNTER — CLINICAL SUPPORT (OUTPATIENT)
Dept: SLEEP MEDICINE | Facility: CLINIC | Age: 64
End: 2024-08-18
Payer: COMMERCIAL

## 2024-08-18 VITALS — BODY MASS INDEX: 25.92 KG/M2 | HEIGHT: 74 IN | WEIGHT: 201.94 LBS

## 2024-08-18 DIAGNOSIS — G47.33 OBSTRUCTIVE SLEEP APNEA (ADULT) (PEDIATRIC): ICD-10-CM

## 2024-08-19 NOTE — PROGRESS NOTES
[facility] TECH NOTE:     Patient: Jon Ibarra   MRN//AGE: [id number]  1960  64 y.o.   Technologist: BLAINE EARLY   Room: 1   Service Date: 2024        Sleep Testing Location: [place]    [place]: 3    TECHNOLOGIST SLEEP STUDY PROCEDURE NOTE:   This sleep study is being conducted according to the policies and procedures outlined by the AAS accreditation standards.  The sleep study procedure and processes involved during this appointment was explained to the patient/patient’s family, questions were answered. The patient/family verbalized understanding.      The patient is a 64 y.o. year old male scheduled for a Diagnostic PSG Split night with montage of:  PSG . he arrived for his appointment.      The study that was ultimately completed was a Diagnostic PSG Split night with montage of:  PSG .    The full study Was completed.  Patient questionnaires completed?: yes     Consents signed? yes    Initial Fall Risk Screening:     Jon has not fallen in the last 6 months. his did not result in injury. Jon does not have a fear of falling. He does not need assistance with sitting, standing, or walking. he does not need assistance walking in his home. he does not need assistance in an unfamiliar setting. The patient is notusing an assistive device.     Brief Study observations:   JON IBARRA  2024  MRN [id number]  Patient is a 64 year old male with a history of Aneurysm of thoracic aorta, Aortic valve disorder, Hypertension, osteoarthritis, Tricuspid regurgitation, Cerebrovascular aneurysm, CAD (coronary artery disease), hypercholesterolemia, GARY.  Observations:  The hookup was done in the room, and the purpose of all sensors was explained and all questions were answered. The importance of some supine sleep with minimal pillow elevation was emphasized, but the tech assured the patient that side sleep was acceptable as well. Biocals were completed. Normal Sleep Onset was observed. Once asleep,  Respiratory events were seen while supine and on his side, so CPAP was gradually increased from 4 cmH2o to 14 cmH2O. No Humidification is added. Patient used F&P Simplus mask size M. At the final pressure respiratory events were controlled.     The patient was observed sleeping supine and both sides. All sleeps stages were seen. REM was captured. arrythmias were seen.  Baseline SaO2 was 99%, and SaO2 low was 89%.   Meds: Acetaminophen, Amlodipine, Aspirin, Plavix, Metoprolol Succinate.     Deviation to order/protocol and reason: NA      If PAP, which was preferred mask/pressure/mode: F&P Simplus mask size Medium      Other:None    After the procedure, the patient/family was informed to ensure followup with ordering clinician for testing results.      Technologist: BLAINE EARLY

## 2024-08-28 ENCOUNTER — TELEPHONE (OUTPATIENT)
Dept: SLEEP MEDICINE | Facility: HOSPITAL | Age: 64
End: 2024-08-28
Payer: COMMERCIAL

## 2024-08-28 DIAGNOSIS — G47.33 OSA (OBSTRUCTIVE SLEEP APNEA): ICD-10-CM

## 2024-08-28 NOTE — TELEPHONE ENCOUNTER
Called patient regarding the sleep study result. Patient agreeable to start APAP therapy with [facility]. Patient scheduled for a follow-up appointment with Dr. Whittington between 30-90 days after CPAP initiation. Patient verbalized understanding, all questions answered.

## 2024-10-30 ENCOUNTER — APPOINTMENT (OUTPATIENT)
Dept: SLEEP MEDICINE | Facility: CLINIC | Age: 64
End: 2024-10-30
Payer: COMMERCIAL

## 2024-11-18 ENCOUNTER — OFFICE VISIT (OUTPATIENT)
Dept: PRIMARY CARE CLINIC | Age: 64
End: 2024-11-18

## 2024-11-18 VITALS
HEIGHT: 74 IN | BODY MASS INDEX: 27.26 KG/M2 | OXYGEN SATURATION: 98 % | HEART RATE: 64 BPM | DIASTOLIC BLOOD PRESSURE: 68 MMHG | WEIGHT: 212.4 LBS | SYSTOLIC BLOOD PRESSURE: 102 MMHG

## 2024-11-18 DIAGNOSIS — H61.22 IMPACTED CERUMEN OF LEFT EAR: Primary | ICD-10-CM

## 2024-11-18 DIAGNOSIS — Z01.10 EVALUATION OF HEARING IMPAIRMENT: ICD-10-CM

## 2024-11-18 DIAGNOSIS — H65.91 RIGHT OTITIS MEDIA WITH EFFUSION: ICD-10-CM

## 2024-11-18 RX ORDER — LEVOCETIRIZINE DIHYDROCHLORIDE 5 MG/1
5 TABLET, FILM COATED ORAL NIGHTLY
Qty: 30 TABLET | Refills: 3 | Status: SHIPPED | OUTPATIENT
Start: 2024-11-18

## 2024-11-18 ASSESSMENT — PATIENT HEALTH QUESTIONNAIRE - PHQ9
2. FEELING DOWN, DEPRESSED OR HOPELESS: NOT AT ALL
1. LITTLE INTEREST OR PLEASURE IN DOING THINGS: NOT AT ALL
SUM OF ALL RESPONSES TO PHQ QUESTIONS 1-9: 0
SUM OF ALL RESPONSES TO PHQ QUESTIONS 1-9: 0
SUM OF ALL RESPONSES TO PHQ9 QUESTIONS 1 & 2: 0
SUM OF ALL RESPONSES TO PHQ QUESTIONS 1-9: 0
SUM OF ALL RESPONSES TO PHQ QUESTIONS 1-9: 0

## 2024-11-18 NOTE — PROGRESS NOTES
[facility]  [address]  [address]  [place] [address]  Dept: [phone]  Dept Fax: [phone]  Loc: [phone]     11/18/2024    Visit type: Office visit    Reason for Visit: Ear Fullness (Both ears feel clogged )       ASSESSMENT/PLAN   1. Impacted cerumen of left ear  -     REMOVAL IMPACTED CERUMEN IRRIGATION/LVG UNILAT  2. Evaluation of hearing impairment  -     Sharyn Solorio MD , Otolaryngology/ENT - [place]  3. Right otitis media with effusion  -     levocetirizine (XYZAL) 5 MG tablet; Take 1 tablet by mouth nightly, Disp-30 tablet, R-3Normal        No follow-ups on file.  Orders Placed This Encounter   Medications    levocetirizine (XYZAL) 5 MG tablet     Sig: Take 1 tablet by mouth nightly     Dispense:  30 tablet     Refill:  3      Subjective    Patient: Jos Cox is a 64 y.o. male     HPI: feels his ears are clogged. Had them cleaned out last year. Says its pretty bad on the right side. Now having ringing.       Review of Systems   Objective     Vitals:    11/18/24 1328   BP: 102/68   Site: Left Upper Arm   Position: Sitting   Cuff Size: Large Adult   Pulse: 64   SpO2: 98%   Weight: 96.3 kg (212 lb 6.4 oz)   Height: 1.88 m (6' 2\")     Physical Exam  No Known Allergies    Current Outpatient Medications:     levocetirizine (XYZAL) 5 MG tablet, Take 1 tablet by mouth nightly, Disp: 30 tablet, Rfl: 3    metoprolol (LOPRESSOR) 100 MG tablet, Take 25 mg by mouth 2 times daily, Disp: , Rfl:     acetaminophen (TYLENOL) 325 MG tablet, Take by mouth, Disp: , Rfl:     Testosterone 25 MG PLLT, Take 1 capsule by mouth daily. Max Daily Amount: 1 capsule, Disp: , Rfl:     sildenafil (VIAGRA) 50 MG tablet, 0.5 tablets, Disp: , Rfl:     lidocaine (LIDODERM) 5 %, Place onto the skin, Disp: , Rfl:     diclofenac sodium (VOLTAREN) 1 % GEL, APPLY 2GM AS MEASURED ON DOSING CARD EXTERNALLY TWICE A DAY TO WRIST JOINTS FOR PAIN

## 2024-11-21 NOTE — PROGRESS NOTES
History: Jon is here for his knees.  He has moderate arthritis in both knees. We gave him a cortisone injection in his bilateral knees on 06/12/24. Injections gave him significant relief. His knees are starting to become more stiff recently because he started driving and walking more. He has not had viscosupplementation. He takes Tylenol and ibuprofen for his arthritis.     Past medical history: Multiple  Medications: Multiple  Allergies: No known drug allergies    Please refer to the intake H&P regarding the patient's review of systems, family history and social history as was done today    HEENT: Normal  Lungs: Clear to auscultation  Heart: RRR  Abdomen: Soft, nontender  Skin: clear  Extremity: He has a well-healed incision anteriorly in the right knee.  There is some palpable hardware toward the tibial tubercle.  He has full range of motion with 1+ crepitus.  More pain medially on the right than the left.  No numbness or tingling.  Upper extremity exam is normal for strength, motion, stability and neurovascular assessment.    Radiographs: X-rays show moderate to severe medial joint space narrowing on the right side and more moderate changes on the left.  There is tibial hardware from his prior osteotomy.    Assessment: Moderate to severe bilateral knee DJD, right greater than left    Plan: His right knee is more painful than his left. We discussed viscosupplementation today, and we will pre-cert him for this in the future. I gave him a cortisone injection into his bilateral knees today. We discussed his long term option is knee replacements. He will give us a call when he is ready for gel injections.    All questions were answered today with the patient.    L Inj/Asp: bilateral knee on 11/25/2024 8:53 AM  Indications: pain  Details: 22 G needle, lateral approach  Medications (Right): 5 mL lidocaine 10 mg/mL (1 %); 12 mg betamethasone acet,sod phos 6 mg/mL  Medications (Left): 5 mL lidocaine 10 mg/mL (1  %); 12 mg betamethasone acet,sod phos 6 mg/mL  Outcome: tolerated well, no immediate complications  Procedure, treatment alternatives, risks and benefits explained, specific risks discussed. Consent was given by the patient. Immediately prior to procedure a time out was called to verify the correct patient, procedure, equipment, support staff and site/side marked as required. Patient was prepped and draped in the usual sterile fashion.            Scribe Attestation  By signing my name below, IMaura Scribe   attest that this documentation has been prepared under the direction and in the presence of Eleazar Conrad MD.

## 2024-11-25 ENCOUNTER — OFFICE VISIT (OUTPATIENT)
Dept: ORTHOPEDIC SURGERY | Facility: CLINIC | Age: 64
End: 2024-11-25
Payer: COMMERCIAL

## 2024-11-25 ENCOUNTER — HOSPITAL ENCOUNTER (OUTPATIENT)
Dept: RADIOLOGY | Facility: CLINIC | Age: 64
Discharge: HOME | End: 2024-11-25
Payer: COMMERCIAL

## 2024-11-25 DIAGNOSIS — M17.0 BILATERAL PRIMARY OSTEOARTHRITIS OF KNEE: ICD-10-CM

## 2024-11-25 PROCEDURE — 99214 OFFICE O/P EST MOD 30 MIN: CPT | Mod: 25 | Performed by: ORTHOPAEDIC SURGERY

## 2024-11-25 PROCEDURE — 73564 X-RAY EXAM KNEE 4 OR MORE: CPT | Mod: BILATERAL PROCEDURE | Performed by: ORTHOPAEDIC SURGERY

## 2024-11-25 PROCEDURE — 2500000004 HC RX 250 GENERAL PHARMACY W/ HCPCS (ALT 636 FOR OP/ED): Performed by: ORTHOPAEDIC SURGERY

## 2024-11-25 PROCEDURE — 20610 DRAIN/INJ JOINT/BURSA W/O US: CPT | Mod: 50 | Performed by: ORTHOPAEDIC SURGERY

## 2024-11-25 PROCEDURE — 73564 X-RAY EXAM KNEE 4 OR MORE: CPT | Mod: 50

## 2024-11-25 PROCEDURE — 99214 OFFICE O/P EST MOD 30 MIN: CPT | Performed by: ORTHOPAEDIC SURGERY

## 2024-11-25 PROCEDURE — 1036F TOBACCO NON-USER: CPT | Performed by: ORTHOPAEDIC SURGERY

## 2024-11-25 RX ORDER — LIDOCAINE HYDROCHLORIDE 10 MG/ML
5 INJECTION, SOLUTION INFILTRATION; PERINEURAL
Status: COMPLETED | OUTPATIENT
Start: 2024-11-25 | End: 2024-11-25

## 2024-11-25 RX ORDER — BETAMETHASONE SODIUM PHOSPHATE AND BETAMETHASONE ACETATE 3; 3 MG/ML; MG/ML
12 INJECTION, SUSPENSION INTRA-ARTICULAR; INTRALESIONAL; INTRAMUSCULAR; SOFT TISSUE
Status: COMPLETED | OUTPATIENT
Start: 2024-11-25 | End: 2024-11-25

## 2024-12-16 ENCOUNTER — APPOINTMENT (OUTPATIENT)
Dept: SLEEP MEDICINE | Facility: CLINIC | Age: 64
End: 2024-12-16
Payer: COMMERCIAL

## 2024-12-31 NOTE — PROGRESS NOTES
[facility] SPECIALTY CARE, [facility] OTOLARYNGOLOGY  [address] [place] [address], [address]  [facility] [address]  Dept: [phone]  Dept Fax: [phone]  Loc: [phone]     1/2/2025    Visit type: New patient    Reason for Visit: New Patient (Evaluation of hearing impairment/)       ASSESSMENT/PLAN   1. Tinnitus of right ear  2. Ear fullness, right  3. Hearing loss of right ear, unspecified hearing loss type    No orders of the defined types were placed in this encounter.     Assessment & Plan  1. Hearing loss.  The patient reports a sensation of buzzing and clogged ears, primarily in the right ear, with minimal earwax present. HThe buzzing sensation could be indicative of hearing loss, potentially due to noise exposure, genetic factors, or viral causes. A formal audiometric evaluation will be scheduled to assess the extent of hearing loss. If the hearing test reveals a significant asymmetry between the ears, an MRI may be considered to further investigate the cause.      Subjective    Patient: Jos Cox is a 64 y.o. male   HPI:    Referred by: Maria M Stanford MD  I have personally reviewed the note by the referring provider     History of Present Illness  The patient is a 64-year-old male who presents today for hearing loss.    He was referred to our clinic following a cerumen removal procedure due to ear blockage. During the subsequent cerumen removal, it was noted that there was minimal wax present in his ear. He reports experiencing a buzzing sensation upon awakening, predominantly in the right ear, which he describes as feeling clogged. This sensation is intermittent and often accompanied by significant hearing impairment. He has not undergone a hearing test in recent years. He has no history of ear infections or surgeries and reports no otorrhea, although he occasionally notices cerumen discharge. He does not experience any ear pain but reports a sensation of

## 2025-01-02 ENCOUNTER — OFFICE VISIT (OUTPATIENT)
Age: 65
End: 2025-01-02

## 2025-01-02 VITALS
BODY MASS INDEX: 27.21 KG/M2 | SYSTOLIC BLOOD PRESSURE: 120 MMHG | RESPIRATION RATE: 15 BRPM | TEMPERATURE: 97.5 F | WEIGHT: 212 LBS | DIASTOLIC BLOOD PRESSURE: 80 MMHG | HEIGHT: 74 IN | OXYGEN SATURATION: 98 % | HEART RATE: 69 BPM

## 2025-01-02 DIAGNOSIS — H91.91 HEARING LOSS OF RIGHT EAR, UNSPECIFIED HEARING LOSS TYPE: ICD-10-CM

## 2025-01-02 DIAGNOSIS — H93.8X1 EAR FULLNESS, RIGHT: ICD-10-CM

## 2025-01-02 DIAGNOSIS — H93.11 TINNITUS OF RIGHT EAR: Primary | ICD-10-CM

## 2025-01-08 ENCOUNTER — APPOINTMENT (OUTPATIENT)
Dept: CARDIOLOGY | Facility: CLINIC | Age: 65
End: 2025-01-08
Payer: COMMERCIAL

## 2025-02-05 ENCOUNTER — APPOINTMENT (OUTPATIENT)
Dept: CARDIOLOGY | Facility: CLINIC | Age: 65
End: 2025-02-05
Payer: COMMERCIAL

## 2025-03-19 ENCOUNTER — APPOINTMENT (OUTPATIENT)
Dept: CARDIOLOGY | Facility: CLINIC | Age: 65
End: 2025-03-19
Payer: COMMERCIAL

## 2025-03-19 VITALS
SYSTOLIC BLOOD PRESSURE: 116 MMHG | WEIGHT: 212.4 LBS | HEIGHT: 74 IN | BODY MASS INDEX: 27.26 KG/M2 | DIASTOLIC BLOOD PRESSURE: 78 MMHG | HEART RATE: 72 BPM

## 2025-03-19 DIAGNOSIS — I10 PRIMARY HYPERTENSION: ICD-10-CM

## 2025-03-19 DIAGNOSIS — I71.20 THORACIC AORTIC ANEURYSM WITHOUT RUPTURE, UNSPECIFIED PART (CMS-HCC): ICD-10-CM

## 2025-03-19 PROCEDURE — G2211 COMPLEX E/M VISIT ADD ON: HCPCS | Performed by: INTERNAL MEDICINE

## 2025-03-19 PROCEDURE — 99213 OFFICE O/P EST LOW 20 MIN: CPT | Performed by: INTERNAL MEDICINE

## 2025-03-19 PROCEDURE — 3078F DIAST BP <80 MM HG: CPT | Performed by: INTERNAL MEDICINE

## 2025-03-19 PROCEDURE — 3074F SYST BP LT 130 MM HG: CPT | Performed by: INTERNAL MEDICINE

## 2025-03-19 PROCEDURE — 3008F BODY MASS INDEX DOCD: CPT | Performed by: INTERNAL MEDICINE

## 2025-03-19 NOTE — PROGRESS NOTES
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  Repeat CT scan late last year at [facility] unremarkable.      Chief Complaint/Active Symptoms:      Chief Complaint   Patient presents with    Follow-up       Jon Mendes is a 64 y.o. male who presents with hypertension, ascending aortic aneurysm with 2 prior surgeries at [facility]. History of prior aortic root dissection initially in 2002 and then redo of ascending/arch/descending aorta in 2019 [facility] with elephant trunk procedure.Valve sparing aortic root replacement resuspended in 32 m Dacron Valsalva Graft. Replacement of ascending aorta + aortic arch with 26 mm Dacron Gelweave. Frozen elephant trunk [place] 100x28- .  At that time coronary angiography with trivial irregularities only.  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  He has mild hyperlipidemia.     Admitted to [facility] neurosurgery 11/13/2023 through 11/14/2023 with Dr. Eddie Montanez.   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  He underwent flow diversion of the aneurysm during that hospital stay utilizing endovascular approach and intra-arterial embolization of right SHEA with flow diversion.     he has had prior cerebral aneurysm repairs uncomplicated in the past.         Echo 12/19/2023: EF 56%.  Mild RV dilatation with slight decreased RV systolic function.  Generalized aorta borderline dilated at 39 mm.  No AI.  No aortic stenosis.  No MR described     2019: Coronary angiography trivial irregularities noted except LM  which is normal..       Chest CTA 12/19/2023 excellent postop appearance and excellent surgical result.  Mildly ectatic proximal descending aorta at 42 mm unchanged from previous    I last seen him 1/9/2024 blood pressure was controlled at that time.  Is referred back to [facility] genetics to consider repeat genetic testing as previous testing suggested unclear significance and there have been advances.  CT scan just prior that showed very stable surgical results.  He has no coronary disease         November 2024 [facility]  Underwent angiography at [facility] of right common carotid artery.  This to assess for interval changes of his aneurysms.  No evidence of residual filling of his aneurysms stable no further procedures required    11/5/2024 CBC/BMP normal.  No recent lipid profile    1/7/2025 echocardiogram done at [facility]: LVEF 56%.  Normal RV.  Resuspended aortic valve continues to function well.  RVSP normal.    He denies angina, dyspnea, palpitation, edema, lightheadedness or syncope.  He has had no symptoms of claudication or neurologic deterioration.  There have been no hospitalizations or emergency room visits since last office visit.    He continues to do very well 3 days a week he works out at the gym does treadmill exercise and some weight lifting.  He has no cardiovascular symptoms whatsoever.  He has had recent cholesterol which is normal.  Continues to work driving a truck.      Review of Systems: Unremarkable except as noted above    Meds     Current Outpatient Medications   Medication Instructions    acetaminophen (TYLENOL) 325 mg, Every 4 hours PRN    amLODIPine (NORVASC) 5 mg, oral, Daily    aspirin 325 mg, Daily    B complex-vitamin C-folic acid (Nephro-Selene Rx) 1- mg-mg-mcg tablet 1 tablet, oral, Daily with breakfast    clopidogrel (PLAVIX) 75 mg, oral, Daily    metoprolol succinate XL (TOPROL-XL) 100 mg, oral, 2 times daily    multivitamin tablet 1 tablet, Daily        Allergies   No  "Known Allergies      Annotated Problems     Specialty Problems          Cardiology Problems    Aneurysm of thoracic aorta (CMS-HCC)     Chest CTA 12/19/2023 excellent postop appearance and excellent surgical result.  Mildly ectatic proximal descending aorta at 42 mm unchanged from previous    History of prior aortic root dissection initially in 2002 and then redo of ascending/arch/descending aorta in 2019 [facility] with elephant trunk procedure.Valve sparing aortic root replacement resuspended in 32 m Dacron Valsalva Graft. Replacement of ascending aorta + aortic arch with 26 mm Dacron Gelweave. Frozen elephant trunk [place] 100x28- 2019\"          Aortic valve disorder     Echo 12/19/2023: EF 56%.  Mild RV dilatation with slight decreased RV systolic function.  Generalized aorta borderline dilated at 39 mm.  No AI.  No aortic stenosis.  No MR described         Hypertension    Tricuspid regurgitation    CAD (coronary artery disease)     2019: Coronary angiography trivial irregularities noted except LM which is normal.         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/2024    Obstructive sleep apnea (adult) (pediatric) 07/24/2024    Cerebrovascular aneurysm ([facility]) 01/09/2024    CAD (coronary artery disease) 01/09/2024    Pure hypercholesterolemia 01/09/2024    Aneurysm of thoracic aorta (CMS-HCC) 12/18/2023    Aortic valve disorder 12/18/2023    Hypertension 12/18/2023    Knee effusion 12/18/2023    Knee internal derangement 12/18/2023    Knee osteoarthritis 12/18/2023    Right knee DJD 12/18/2023    Knee pain, left 12/18/2023    Knee pain, right 12/18/2023    Patellar tendon rupture, right, sequela 12/18/2023    Tricuspid regurgitation 12/18/2023       Objective:     Vitals:    03/19/25 0949   BP: 116/78   BP Location: Left arm   Patient Position: Sitting   Pulse: 72   Weight: 96.3 kg (212 lb 6.4 oz)   Height: 1.88 m " "(6' 2\")      Wt Readings from Last 4 Encounters:   03/19/25 96.3 kg (212 lb 6.4 oz)   08/18/24 91.6 kg (201 lb 15.1 oz)   07/24/24 91.6 kg (202 lb)   01/09/24 99.8 kg (220 lb)           LAB:     Lab Results   Component Value Date    WBC 6.8 05/26/2021    HGB 14.6 05/26/2021    HCT 43.2 05/26/2021     05/26/2021    CHOL 185 03/16/2021    TRIG 250 (H) 03/16/2021    HDL 50.0 03/16/2021    ALT 29 05/26/2021    AST 29 05/26/2021     05/26/2021    K 3.9 05/26/2021     05/26/2021    CREATININE 1.09 05/26/2021    BUN 18 05/26/2021    CO2 27 05/26/2021    INR 1.0 05/26/2021         Physical Exam     General Appearance: alert and oriented to person, place and time, in no acute distress  Cardiovascular: normal rate, regular rhythm, normal S1 and S2, 1/6 systolic flow murmur, no rubs, clicks, or gallops,  no JVD  Pulmonary/Chest: clear to auscultation bilaterally- no wheezes, rales or rhonchi, normal air movement, no respiratory distress  Abdomen: soft, non-tender, non-distended, normal bowel sounds, no masses   Extremities: no cyanosis, clubbing or edema  Skin: warm and dry, no rash or erythema  Eyes: EOMI  Neck: supple and non-tender without mass, no thyromegaly   Neurological: alert, oriented, normal speech, no focal findings or movement disorder noted  Vascular: Pulses 2+      Provider attestation-scribe documentation  Any medical record entries made by the scribe were at my discretion and personally dictated by me.  I have reviewed the chart and agree that the record accurately reflects my personal performance of the history, physical exam, discussion and plan.          Scribe Attestation  By signing my name below, I, Nadya Ashraf LPN , Scribe   attest that this documentation has been prepared under the direction and in the presence of Dr. Pollo Esqueda MD.    "

## 2025-03-19 NOTE — PATIENT INSTRUCTIONS
Follow up       DID YOU KNOW  We have a pharmacy here in the [facility].  They can fill all prescriptions, not just cardiac medications.  Prescriptions from other pharmacies can easily be transferred to the  pharmacy by the  pharmacist on site.   pharmacies offer FREE HOME DELIVERY on medications to anywhere in [place]. They can sync your medications. Typically prescriptions can be ready in 10 - 15 minutes. If pharmacy is unable to fill your  prescription or if cost is more than your paying now the Pharmacist can easily transfer back to your Pharmacy of choice. Pharmacy phone # [phone].     Please bring all medicines, vitamins, and herbal supplements with you in original bottles to every appointment!!!!    Prescriptions will not be filled unless you are compliant with your follow up appointments or have a follow up appointment scheduled as per instruction of your physician. Refills should be requested at the time of your visit.

## 2025-05-21 DIAGNOSIS — H65.91 RIGHT OTITIS MEDIA WITH EFFUSION: ICD-10-CM

## 2025-05-21 RX ORDER — LEVOCETIRIZINE DIHYDROCHLORIDE 5 MG/1
5 TABLET, FILM COATED ORAL NIGHTLY
Qty: 30 TABLET | Refills: 0 | Status: SHIPPED | OUTPATIENT
Start: 2025-05-21

## 2025-07-03 ENCOUNTER — COMMUNITY OUTREACH (OUTPATIENT)
Dept: PRIMARY CARE CLINIC | Age: 65
End: 2025-07-03

## 2025-07-06 NOTE — PROGRESS NOTES
Jon is here for bilateral knee cortisone injections.  His last injections were almost 8 months ago, but have started to wear off.    L Inj/Asp: bilateral knee on 7/7/2025 9:52 AM  Indications: pain  Details: 22 G needle, lateral approach  Medications (Right): 5 mL lidocaine 10 mg/mL (1 %); 12 mg betamethasone acet,sod phos 6 mg/mL  Medications (Left): 5 mL lidocaine 10 mg/mL (1 %); 12 mg betamethasone acet,sod phos 6 mg/mL  Outcome: tolerated well, no immediate complications  Procedure, treatment alternatives, risks and benefits explained, specific risks discussed. Consent was given by the patient. Immediately prior to procedure a time out was called to verify the correct patient, procedure, equipment, support staff and site/side marked as required. Patient was prepped and draped in the usual sterile fashion.         He will ice and work on range of motion and exercises as directed.    He will follow up on an as-needed basis.  He can call to pre-CERT gel injections in the future if desired.

## 2025-07-07 ENCOUNTER — OFFICE VISIT (OUTPATIENT)
Dept: ORTHOPEDIC SURGERY | Facility: CLINIC | Age: 65
End: 2025-07-07
Payer: COMMERCIAL

## 2025-07-07 DIAGNOSIS — M17.0 BILATERAL PRIMARY OSTEOARTHRITIS OF KNEE: Primary | ICD-10-CM

## 2025-07-07 PROCEDURE — 1036F TOBACCO NON-USER: CPT | Performed by: ORTHOPAEDIC SURGERY

## 2025-07-07 PROCEDURE — 20610 DRAIN/INJ JOINT/BURSA W/O US: CPT | Performed by: ORTHOPAEDIC SURGERY

## 2025-07-07 PROCEDURE — 2500000004 HC RX 250 GENERAL PHARMACY W/ HCPCS (ALT 636 FOR OP/ED): Performed by: ORTHOPAEDIC SURGERY

## 2025-07-07 PROCEDURE — 99212 OFFICE O/P EST SF 10 MIN: CPT | Mod: 25 | Performed by: ORTHOPAEDIC SURGERY

## 2025-07-07 PROCEDURE — 20610 DRAIN/INJ JOINT/BURSA W/O US: CPT | Mod: 50 | Performed by: ORTHOPAEDIC SURGERY

## 2025-07-07 RX ORDER — LIDOCAINE HYDROCHLORIDE 10 MG/ML
5 INJECTION, SOLUTION INFILTRATION; PERINEURAL
Status: COMPLETED | OUTPATIENT
Start: 2025-07-07 | End: 2025-07-07

## 2025-07-07 RX ORDER — BETAMETHASONE SODIUM PHOSPHATE AND BETAMETHASONE ACETATE 3; 3 MG/ML; MG/ML
12 INJECTION, SUSPENSION INTRA-ARTICULAR; INTRALESIONAL; INTRAMUSCULAR; SOFT TISSUE
Status: COMPLETED | OUTPATIENT
Start: 2025-07-07 | End: 2025-07-07

## 2025-07-07 RX ADMIN — LIDOCAINE HYDROCHLORIDE 5 ML: 10 INJECTION, SOLUTION INFILTRATION; PERINEURAL at 09:52

## 2025-07-07 RX ADMIN — BETAMETHASONE SODIUM PHOSPHATE AND BETAMETHASONE ACETATE 12 MG: 3; 3 INJECTION, SUSPENSION INTRA-ARTICULAR; INTRALESIONAL; INTRAMUSCULAR at 09:52

## 2025-07-08 DIAGNOSIS — H65.91 RIGHT OTITIS MEDIA WITH EFFUSION: ICD-10-CM

## 2025-07-08 RX ORDER — LEVOCETIRIZINE DIHYDROCHLORIDE 5 MG/1
5 TABLET, FILM COATED ORAL NIGHTLY
Qty: 30 TABLET | Refills: 1 | Status: SHIPPED | OUTPATIENT
Start: 2025-07-08

## 2025-08-05 ENCOUNTER — TELEPHONE (OUTPATIENT)
Dept: CARDIOLOGY | Facility: CLINIC | Age: 65
End: 2025-08-05
Payer: COMMERCIAL

## 2025-08-05 NOTE — TELEPHONE ENCOUNTER
Patient called the office stating he has a colonoscopy on 8/12. [facility] is requesting [facility] to hold Aspirin 7 days prior to procedure. Patient is asking if that is ok with Dr. Pollo Esqueda MD, FACC.   Mounika Anthony MA

## 2026-03-11 ENCOUNTER — APPOINTMENT (OUTPATIENT)
Dept: CARDIOLOGY | Facility: CLINIC | Age: 66
End: 2026-03-11
Payer: COMMERCIAL